# Patient Record
Sex: MALE | Race: WHITE | Employment: UNEMPLOYED
[De-identification: names, ages, dates, MRNs, and addresses within clinical notes are randomized per-mention and may not be internally consistent; named-entity substitution may affect disease eponyms.]

---

## 2017-01-10 RX ORDER — LOVASTATIN 20 MG/1
TABLET ORAL
Qty: 30 TABLET | Refills: 11 | Status: SHIPPED | OUTPATIENT
Start: 2017-01-10 | End: 2017-12-04 | Stop reason: SDUPTHER

## 2017-01-10 RX ORDER — FUROSEMIDE 20 MG/1
TABLET ORAL
Qty: 120 TABLET | Refills: 11 | Status: SHIPPED | OUTPATIENT
Start: 2017-01-10 | End: 2017-05-18 | Stop reason: SDUPTHER

## 2017-01-24 ENCOUNTER — OFFICE VISIT (OUTPATIENT)
Dept: FAMILY MEDICINE CLINIC | Facility: CLINIC | Age: 82
End: 2017-01-24

## 2017-01-24 VITALS
SYSTOLIC BLOOD PRESSURE: 126 MMHG | DIASTOLIC BLOOD PRESSURE: 80 MMHG | OXYGEN SATURATION: 97 % | HEART RATE: 53 BPM | BODY MASS INDEX: 29.6 KG/M2 | WEIGHT: 189 LBS

## 2017-01-24 DIAGNOSIS — I25.10 CORONARY ARTERY DISEASE INVOLVING NATIVE CORONARY ARTERY OF NATIVE HEART WITHOUT ANGINA PECTORIS: Chronic | ICD-10-CM

## 2017-01-24 DIAGNOSIS — J41.0 SIMPLE CHRONIC BRONCHITIS (HCC): Chronic | ICD-10-CM

## 2017-01-24 DIAGNOSIS — I38 VALVULAR HEART DISEASE: ICD-10-CM

## 2017-01-24 DIAGNOSIS — R06.02 SOB (SHORTNESS OF BREATH) ON EXERTION: Primary | ICD-10-CM

## 2017-01-24 PROCEDURE — 4040F PNEUMOC VAC/ADMIN/RCVD: CPT

## 2017-01-24 PROCEDURE — G8598 ASA/ANTIPLAT THER USED: HCPCS

## 2017-01-24 PROCEDURE — G8926 SPIRO NO PERF OR DOC: HCPCS

## 2017-01-24 PROCEDURE — G8427 DOCREV CUR MEDS BY ELIG CLIN: HCPCS

## 2017-01-24 PROCEDURE — 99214 OFFICE O/P EST MOD 30 MIN: CPT

## 2017-01-24 PROCEDURE — G8420 CALC BMI NORM PARAMETERS: HCPCS

## 2017-01-24 PROCEDURE — G8484 FLU IMMUNIZE NO ADMIN: HCPCS

## 2017-01-24 PROCEDURE — 3023F SPIROM DOC REV: CPT

## 2017-01-24 PROCEDURE — 1123F ACP DISCUSS/DSCN MKR DOCD: CPT

## 2017-01-24 PROCEDURE — 1036F TOBACCO NON-USER: CPT

## 2017-01-26 DIAGNOSIS — R06.02 SOB (SHORTNESS OF BREATH) ON EXERTION: ICD-10-CM

## 2017-01-27 ENCOUNTER — TELEPHONE (OUTPATIENT)
Dept: FAMILY MEDICINE CLINIC | Facility: CLINIC | Age: 82
End: 2017-01-27

## 2017-02-01 ENCOUNTER — OFFICE VISIT (OUTPATIENT)
Dept: FAMILY MEDICINE CLINIC | Facility: CLINIC | Age: 82
End: 2017-02-01

## 2017-02-01 VITALS
TEMPERATURE: 98.6 F | BODY MASS INDEX: 29.79 KG/M2 | SYSTOLIC BLOOD PRESSURE: 130 MMHG | HEART RATE: 53 BPM | OXYGEN SATURATION: 98 % | WEIGHT: 190.2 LBS | DIASTOLIC BLOOD PRESSURE: 80 MMHG

## 2017-02-01 DIAGNOSIS — J41.0 SIMPLE CHRONIC BRONCHITIS (HCC): Primary | ICD-10-CM

## 2017-02-01 PROCEDURE — 4040F PNEUMOC VAC/ADMIN/RCVD: CPT

## 2017-02-01 PROCEDURE — G8926 SPIRO NO PERF OR DOC: HCPCS

## 2017-02-01 PROCEDURE — G8420 CALC BMI NORM PARAMETERS: HCPCS

## 2017-02-01 PROCEDURE — G8598 ASA/ANTIPLAT THER USED: HCPCS

## 2017-02-01 PROCEDURE — 1036F TOBACCO NON-USER: CPT

## 2017-02-01 PROCEDURE — 99213 OFFICE O/P EST LOW 20 MIN: CPT

## 2017-02-01 PROCEDURE — G8484 FLU IMMUNIZE NO ADMIN: HCPCS

## 2017-02-01 PROCEDURE — 1123F ACP DISCUSS/DSCN MKR DOCD: CPT

## 2017-02-01 PROCEDURE — G8428 CUR MEDS NOT DOCUMENT: HCPCS

## 2017-02-01 PROCEDURE — 3023F SPIROM DOC REV: CPT

## 2017-02-01 ASSESSMENT — ENCOUNTER SYMPTOMS
CHEST TIGHTNESS: 0
COUGH: 1
STRIDOR: 0
WHEEZING: 0

## 2017-02-03 ASSESSMENT — ENCOUNTER SYMPTOMS
SHORTNESS OF BREATH: 1
BLURRED VISION: 0
STRIDOR: 0
SORE THROAT: 0
SPUTUM PRODUCTION: 0
WHEEZING: 0
ORTHOPNEA: 0
GASTROINTESTINAL NEGATIVE: 1
CHEST TIGHTNESS: 0
COUGH: 0

## 2017-02-08 ENCOUNTER — HOSPITAL ENCOUNTER (OUTPATIENT)
Age: 82
Discharge: HOME | End: 2017-02-08
Payer: MEDICARE

## 2017-02-08 LAB
INR BLD: 2.7
PROTHROMBIN TIME: 30.1 SEC (ref 9.7–12)

## 2017-02-08 PROCEDURE — 36415 COLL VENOUS BLD VENIPUNCTURE: CPT

## 2017-02-08 PROCEDURE — 85610 PROTHROMBIN TIME: CPT

## 2017-03-08 ENCOUNTER — HOSPITAL ENCOUNTER (OUTPATIENT)
Age: 82
Discharge: HOME | End: 2017-03-08
Payer: MEDICARE

## 2017-03-08 LAB
INR BLD: 3
PROTHROMBIN TIME: 33.4 SEC (ref 9.7–12)

## 2017-03-08 PROCEDURE — 36415 COLL VENOUS BLD VENIPUNCTURE: CPT

## 2017-03-08 PROCEDURE — 85610 PROTHROMBIN TIME: CPT

## 2017-03-15 ENCOUNTER — OFFICE VISIT (OUTPATIENT)
Dept: FAMILY MEDICINE CLINIC | Age: 82
End: 2017-03-15
Payer: MEDICARE

## 2017-03-15 VITALS
SYSTOLIC BLOOD PRESSURE: 128 MMHG | HEART RATE: 63 BPM | BODY MASS INDEX: 29.57 KG/M2 | DIASTOLIC BLOOD PRESSURE: 80 MMHG | OXYGEN SATURATION: 97 % | WEIGHT: 188.8 LBS

## 2017-03-15 DIAGNOSIS — J41.0 SIMPLE CHRONIC BRONCHITIS (HCC): Primary | ICD-10-CM

## 2017-03-15 PROCEDURE — G8420 CALC BMI NORM PARAMETERS: HCPCS

## 2017-03-15 PROCEDURE — 1123F ACP DISCUSS/DSCN MKR DOCD: CPT

## 2017-03-15 PROCEDURE — 3023F SPIROM DOC REV: CPT

## 2017-03-15 PROCEDURE — 1036F TOBACCO NON-USER: CPT

## 2017-03-15 PROCEDURE — G8598 ASA/ANTIPLAT THER USED: HCPCS

## 2017-03-15 PROCEDURE — G8427 DOCREV CUR MEDS BY ELIG CLIN: HCPCS

## 2017-03-15 PROCEDURE — 99213 OFFICE O/P EST LOW 20 MIN: CPT

## 2017-03-15 PROCEDURE — G8926 SPIRO NO PERF OR DOC: HCPCS

## 2017-03-15 PROCEDURE — G8484 FLU IMMUNIZE NO ADMIN: HCPCS

## 2017-03-15 PROCEDURE — 4040F PNEUMOC VAC/ADMIN/RCVD: CPT

## 2017-03-15 ASSESSMENT — ENCOUNTER SYMPTOMS
WHEEZING: 0
STRIDOR: 0
SHORTNESS OF BREATH: 1
COUGH: 1

## 2017-03-31 ENCOUNTER — TELEPHONE (OUTPATIENT)
Dept: FAMILY MEDICINE CLINIC | Age: 82
End: 2017-03-31

## 2017-04-11 ENCOUNTER — OFFICE VISIT (OUTPATIENT)
Dept: FAMILY MEDICINE CLINIC | Age: 82
End: 2017-04-11
Payer: MEDICARE

## 2017-04-11 VITALS
HEART RATE: 86 BPM | WEIGHT: 183.6 LBS | OXYGEN SATURATION: 98 % | BODY MASS INDEX: 28.76 KG/M2 | DIASTOLIC BLOOD PRESSURE: 80 MMHG | SYSTOLIC BLOOD PRESSURE: 122 MMHG | TEMPERATURE: 98 F

## 2017-04-11 DIAGNOSIS — J01.00 ACUTE NON-RECURRENT MAXILLARY SINUSITIS: ICD-10-CM

## 2017-04-11 DIAGNOSIS — J06.9 UPPER RESPIRATORY TRACT INFECTION, UNSPECIFIED TYPE: Primary | ICD-10-CM

## 2017-04-11 PROCEDURE — G8427 DOCREV CUR MEDS BY ELIG CLIN: HCPCS

## 2017-04-11 PROCEDURE — 1036F TOBACCO NON-USER: CPT

## 2017-04-11 PROCEDURE — G8420 CALC BMI NORM PARAMETERS: HCPCS

## 2017-04-11 PROCEDURE — 1123F ACP DISCUSS/DSCN MKR DOCD: CPT

## 2017-04-11 PROCEDURE — 4040F PNEUMOC VAC/ADMIN/RCVD: CPT

## 2017-04-11 PROCEDURE — 99213 OFFICE O/P EST LOW 20 MIN: CPT

## 2017-04-11 PROCEDURE — G8598 ASA/ANTIPLAT THER USED: HCPCS

## 2017-04-11 RX ORDER — LEVOFLOXACIN 500 MG/1
500 TABLET, FILM COATED ORAL DAILY
Qty: 10 TABLET | Refills: 0 | Status: SHIPPED | OUTPATIENT
Start: 2017-04-11 | End: 2017-04-21

## 2017-04-11 ASSESSMENT — ENCOUNTER SYMPTOMS
COUGH: 1
SORE THROAT: 0
SINUS PAIN: 1
NAUSEA: 0
SWOLLEN GLANDS: 0
RHINORRHEA: 1
ABDOMINAL PAIN: 0

## 2017-04-12 ENCOUNTER — HOSPITAL ENCOUNTER (OUTPATIENT)
Age: 82
Discharge: HOME | End: 2017-04-12
Payer: MEDICARE

## 2017-04-12 LAB
INR BLD: 2.3
PROTHROMBIN TIME: 25.6 SEC (ref 9.7–12)

## 2017-04-12 PROCEDURE — 36415 COLL VENOUS BLD VENIPUNCTURE: CPT

## 2017-04-12 PROCEDURE — 85610 PROTHROMBIN TIME: CPT

## 2017-04-17 ENCOUNTER — HOSPITAL ENCOUNTER (OUTPATIENT)
Age: 82
Discharge: HOME | End: 2017-04-17
Payer: MEDICARE

## 2017-04-17 LAB
INR BLD: 2.8
PROTHROMBIN TIME: 32.1 SEC (ref 9.7–12)

## 2017-04-17 PROCEDURE — 85610 PROTHROMBIN TIME: CPT

## 2017-04-17 PROCEDURE — 36415 COLL VENOUS BLD VENIPUNCTURE: CPT

## 2017-05-16 ENCOUNTER — HOSPITAL ENCOUNTER (OUTPATIENT)
Age: 82
Discharge: HOME | End: 2017-05-16
Payer: MEDICARE

## 2017-05-16 LAB
INR BLD: 2.5
PROTHROMBIN TIME: 27.8 SEC (ref 9.7–12)

## 2017-05-16 PROCEDURE — 36415 COLL VENOUS BLD VENIPUNCTURE: CPT

## 2017-05-16 PROCEDURE — 85610 PROTHROMBIN TIME: CPT

## 2017-05-18 ENCOUNTER — OFFICE VISIT (OUTPATIENT)
Dept: FAMILY MEDICINE CLINIC | Age: 82
End: 2017-05-18
Payer: MEDICARE

## 2017-05-18 VITALS
BODY MASS INDEX: 28.5 KG/M2 | WEIGHT: 181.6 LBS | OXYGEN SATURATION: 97 % | HEART RATE: 70 BPM | DIASTOLIC BLOOD PRESSURE: 66 MMHG | HEIGHT: 67 IN | SYSTOLIC BLOOD PRESSURE: 118 MMHG

## 2017-05-18 DIAGNOSIS — I38 VALVULAR HEART DISEASE: ICD-10-CM

## 2017-05-18 DIAGNOSIS — J41.0 SIMPLE CHRONIC BRONCHITIS (HCC): Primary | ICD-10-CM

## 2017-05-18 PROCEDURE — 1036F TOBACCO NON-USER: CPT

## 2017-05-18 PROCEDURE — G8598 ASA/ANTIPLAT THER USED: HCPCS

## 2017-05-18 PROCEDURE — G8427 DOCREV CUR MEDS BY ELIG CLIN: HCPCS

## 2017-05-18 PROCEDURE — G8926 SPIRO NO PERF OR DOC: HCPCS

## 2017-05-18 PROCEDURE — 3023F SPIROM DOC REV: CPT

## 2017-05-18 PROCEDURE — 99213 OFFICE O/P EST LOW 20 MIN: CPT

## 2017-05-18 PROCEDURE — G8420 CALC BMI NORM PARAMETERS: HCPCS

## 2017-05-18 PROCEDURE — 4040F PNEUMOC VAC/ADMIN/RCVD: CPT

## 2017-05-18 PROCEDURE — 1123F ACP DISCUSS/DSCN MKR DOCD: CPT

## 2017-05-18 RX ORDER — FUROSEMIDE 20 MG/1
TABLET ORAL
Qty: 120 TABLET | Refills: 11
Start: 2017-05-18 | End: 2017-12-04 | Stop reason: SDUPTHER

## 2017-05-18 ASSESSMENT — PATIENT HEALTH QUESTIONNAIRE - PHQ9
SUM OF ALL RESPONSES TO PHQ QUESTIONS 1-9: 0
1. LITTLE INTEREST OR PLEASURE IN DOING THINGS: 0
2. FEELING DOWN, DEPRESSED OR HOPELESS: 0
SUM OF ALL RESPONSES TO PHQ9 QUESTIONS 1 & 2: 0

## 2017-05-18 ASSESSMENT — ENCOUNTER SYMPTOMS
SHORTNESS OF BREATH: 1
COUGH: 1

## 2017-06-21 ENCOUNTER — HOSPITAL ENCOUNTER (OUTPATIENT)
Age: 82
Discharge: HOME | End: 2017-06-21
Payer: MEDICARE

## 2017-06-21 LAB
INR BLD: 2.7
PROTHROMBIN TIME: 30.2 SEC (ref 9.7–12)

## 2017-06-21 PROCEDURE — 85610 PROTHROMBIN TIME: CPT

## 2017-06-21 PROCEDURE — 36415 COLL VENOUS BLD VENIPUNCTURE: CPT

## 2017-07-18 ENCOUNTER — OFFICE VISIT (OUTPATIENT)
Dept: FAMILY MEDICINE CLINIC | Age: 82
End: 2017-07-18
Payer: MEDICARE

## 2017-07-18 VITALS
BODY MASS INDEX: 28.19 KG/M2 | HEART RATE: 72 BPM | SYSTOLIC BLOOD PRESSURE: 116 MMHG | WEIGHT: 180 LBS | DIASTOLIC BLOOD PRESSURE: 82 MMHG

## 2017-07-18 DIAGNOSIS — F03.91 DEMENTIA WITH BEHAVIORAL DISTURBANCE, UNSPECIFIED DEMENTIA TYPE: Primary | ICD-10-CM

## 2017-07-18 DIAGNOSIS — I48.20 CHRONIC ATRIAL FIBRILLATION (HCC): ICD-10-CM

## 2017-07-18 DIAGNOSIS — I71.20 THORACIC AORTIC ANEURYSM WITHOUT RUPTURE: ICD-10-CM

## 2017-07-18 PROCEDURE — 1123F ACP DISCUSS/DSCN MKR DOCD: CPT

## 2017-07-18 PROCEDURE — G8419 CALC BMI OUT NRM PARAM NOF/U: HCPCS

## 2017-07-18 PROCEDURE — 99213 OFFICE O/P EST LOW 20 MIN: CPT

## 2017-07-18 PROCEDURE — G8598 ASA/ANTIPLAT THER USED: HCPCS

## 2017-07-18 PROCEDURE — 1036F TOBACCO NON-USER: CPT

## 2017-07-18 PROCEDURE — 4040F PNEUMOC VAC/ADMIN/RCVD: CPT

## 2017-07-18 PROCEDURE — G8427 DOCREV CUR MEDS BY ELIG CLIN: HCPCS

## 2017-07-18 ASSESSMENT — ENCOUNTER SYMPTOMS
COUGH: 1
SHORTNESS OF BREATH: 1

## 2017-07-26 ENCOUNTER — HOSPITAL ENCOUNTER (OUTPATIENT)
Age: 82
Discharge: HOME | End: 2017-07-26
Payer: MEDICARE

## 2017-07-26 LAB
INR BLD: 1.8
PROTHROMBIN TIME: 19.8 SEC (ref 9.7–12)

## 2017-07-26 PROCEDURE — 36415 COLL VENOUS BLD VENIPUNCTURE: CPT

## 2017-07-26 PROCEDURE — 85610 PROTHROMBIN TIME: CPT

## 2017-08-23 ENCOUNTER — HOSPITAL ENCOUNTER (OUTPATIENT)
Age: 82
Discharge: HOME | End: 2017-08-23
Payer: MEDICARE

## 2017-08-23 LAB
INR BLD: 2.4
PROTHROMBIN TIME: 27.2 SEC (ref 9.7–12)

## 2017-08-23 PROCEDURE — 36415 COLL VENOUS BLD VENIPUNCTURE: CPT

## 2017-08-23 PROCEDURE — 85610 PROTHROMBIN TIME: CPT

## 2017-09-20 ENCOUNTER — HOSPITAL ENCOUNTER (OUTPATIENT)
Age: 82
Discharge: HOME | End: 2017-09-20
Payer: MEDICARE

## 2017-09-20 LAB
INR BLD: 2.8
PROTHROMBIN TIME: 31.8 SEC (ref 9.7–12)

## 2017-09-20 PROCEDURE — 85610 PROTHROMBIN TIME: CPT

## 2017-09-20 PROCEDURE — 36415 COLL VENOUS BLD VENIPUNCTURE: CPT

## 2017-10-19 ENCOUNTER — OFFICE VISIT (OUTPATIENT)
Dept: FAMILY MEDICINE CLINIC | Age: 82
End: 2017-10-19
Payer: MEDICARE

## 2017-10-19 VITALS
WEIGHT: 179 LBS | BODY MASS INDEX: 28.04 KG/M2 | HEART RATE: 69 BPM | SYSTOLIC BLOOD PRESSURE: 130 MMHG | DIASTOLIC BLOOD PRESSURE: 70 MMHG

## 2017-10-19 DIAGNOSIS — Z23 IMMUNIZATION DUE: ICD-10-CM

## 2017-10-19 DIAGNOSIS — I35.1 NONRHEUMATIC AORTIC VALVE INSUFFICIENCY: ICD-10-CM

## 2017-10-19 DIAGNOSIS — I71.21 ASCENDING AORTIC ANEURYSM: Chronic | ICD-10-CM

## 2017-10-19 DIAGNOSIS — J41.0 SIMPLE CHRONIC BRONCHITIS (HCC): Primary | Chronic | ICD-10-CM

## 2017-10-19 DIAGNOSIS — I48.20 CHRONIC ATRIAL FIBRILLATION (HCC): Chronic | ICD-10-CM

## 2017-10-19 PROCEDURE — 4040F PNEUMOC VAC/ADMIN/RCVD: CPT | Performed by: FAMILY MEDICINE

## 2017-10-19 PROCEDURE — G8484 FLU IMMUNIZE NO ADMIN: HCPCS | Performed by: FAMILY MEDICINE

## 2017-10-19 PROCEDURE — 1123F ACP DISCUSS/DSCN MKR DOCD: CPT | Performed by: FAMILY MEDICINE

## 2017-10-19 PROCEDURE — G8427 DOCREV CUR MEDS BY ELIG CLIN: HCPCS | Performed by: FAMILY MEDICINE

## 2017-10-19 PROCEDURE — G8417 CALC BMI ABV UP PARAM F/U: HCPCS | Performed by: FAMILY MEDICINE

## 2017-10-19 PROCEDURE — 3023F SPIROM DOC REV: CPT | Performed by: FAMILY MEDICINE

## 2017-10-19 PROCEDURE — G8926 SPIRO NO PERF OR DOC: HCPCS | Performed by: FAMILY MEDICINE

## 2017-10-19 PROCEDURE — 1036F TOBACCO NON-USER: CPT | Performed by: FAMILY MEDICINE

## 2017-10-19 PROCEDURE — 99213 OFFICE O/P EST LOW 20 MIN: CPT | Performed by: FAMILY MEDICINE

## 2017-10-19 PROCEDURE — G8598 ASA/ANTIPLAT THER USED: HCPCS | Performed by: FAMILY MEDICINE

## 2017-10-19 PROCEDURE — 90688 IIV4 VACCINE SPLT 0.5 ML IM: CPT | Performed by: FAMILY MEDICINE

## 2017-10-19 PROCEDURE — G0008 ADMIN INFLUENZA VIRUS VAC: HCPCS | Performed by: FAMILY MEDICINE

## 2017-10-19 ASSESSMENT — ENCOUNTER SYMPTOMS
BACK PAIN: 0
BLOOD IN STOOL: 0
SHORTNESS OF BREATH: 1
WHEEZING: 0
CONSTIPATION: 0
ABDOMINAL PAIN: 0
COUGH: 0
DIARRHEA: 0

## 2017-10-19 NOTE — PROGRESS NOTES
09/01/2017    Pneumococcal low/med risk  Completed         Past Medical History:   Diagnosis Date    Aortic regurgitation     severe    Ascending aortic aneurysm (HCC)     Atrial fibrillation (HCC)     CAD (coronary artery disease)     Congestive heart failure (HCC)     COPD (chronic obstructive pulmonary disease) ([facility] [address] 2/9/2015    COPD with acute exacerbation (HCC) 2/9/2015    Hyperlipemia     Hypertension     Mitral regurgitation     severe    Mitral valve prolapse     Tricuspid regurgitation     moderate      Past Surgical History:   Procedure Laterality Date    AORTIC VALVE REPLACEMENT      CARDIAC CATHETERIZATION  9/6/11    Dr. Clement Lunsford  9/8/11    Dr. Rusty Floyd       Family History   Problem Relation Age of Onset    Heart Attack Mother     Heart Attack Sister     Hypertension Sister      Social History   Substance Use Topics    Smoking status: Never Smoker    Smokeless tobacco: Not on file    Alcohol use No      Current Outpatient Prescriptions   Medication Sig Dispense Refill    furosemide (LASIX) 20 MG tablet TAKE 2 TABLETS BY MOUTH TWICE DAILY 120 tablet 11    lovastatin (MEVACOR) 20 MG tablet TAKE 1 TABLET BY MOUTH AT BEDTIME 30 tablet 11    Cholecalciferol (VITAMIN D3) 2000 UNITS CAPS Take 1 capsule by mouth daily      latanoprost (XALATAN) 0.005 % ophthalmic solution Place 1 drop into both eyes nightly 1 Bottle 4    metoprolol (LOPRESSOR) 25 MG tablet Take 12.5 mg by mouth 2 times daily      Tetrahydroz-Dextran-PEG-Povid (EYE DROPS ADVANCED RELIEF OP) Apply 1 drop to eye every morning.  Fish Oil-Cholecalciferol (FISH OIL + D3) 7294-8466 MG-UNIT CAPS Take 1 capsule by mouth 2 times daily.  warfarin (COUMADIN) 5 MG tablet Po qd 30 tablet 3    diltiazem (CARDIZEM CD) 120 MG ER capsule Take 1 capsule by mouth daily. 30 capsule 3    digoxin (LANOXIN) 125 MCG tablet Take 1 tablet by mouth daily.  30 tablet 3

## 2017-10-26 ENCOUNTER — HOSPITAL ENCOUNTER (OUTPATIENT)
Age: 82
Discharge: HOME | End: 2017-10-26
Payer: MEDICARE

## 2017-10-26 LAB
INR BLD: 2.5
PROTHROMBIN TIME: 28.3 SEC (ref 9.7–12)

## 2017-10-26 PROCEDURE — 36415 COLL VENOUS BLD VENIPUNCTURE: CPT

## 2017-10-26 PROCEDURE — 85610 PROTHROMBIN TIME: CPT

## 2017-11-02 ENCOUNTER — OFFICE VISIT (OUTPATIENT)
Dept: FAMILY MEDICINE CLINIC | Age: 82
End: 2017-11-02
Payer: MEDICARE

## 2017-11-02 ENCOUNTER — HOSPITAL ENCOUNTER (OUTPATIENT)
Age: 82
Setting detail: SPECIMEN
Discharge: HOME | End: 2017-11-02
Payer: MEDICARE

## 2017-11-02 VITALS — DIASTOLIC BLOOD PRESSURE: 78 MMHG | HEART RATE: 65 BPM | SYSTOLIC BLOOD PRESSURE: 138 MMHG

## 2017-11-02 DIAGNOSIS — F03.90 DEMENTIA WITHOUT BEHAVIORAL DISTURBANCE, UNSPECIFIED DEMENTIA TYPE: Chronic | ICD-10-CM

## 2017-11-02 DIAGNOSIS — R41.3 MEMORY LOSS: ICD-10-CM

## 2017-11-02 DIAGNOSIS — R41.3 MEMORY LOSS: Primary | ICD-10-CM

## 2017-11-02 LAB
ABSOLUTE EOS #: 0.41 K/UL (ref 0–0.44)
ABSOLUTE IMMATURE GRANULOCYTE: <0.03 K/UL (ref 0–0.3)
ABSOLUTE LYMPH #: 2.33 K/UL (ref 1.1–3.7)
ABSOLUTE MONO #: 1.03 K/UL (ref 0.1–1.2)
ALBUMIN SERPL-MCNC: 4.2 G/DL (ref 3.5–5.2)
ALBUMIN/GLOBULIN RATIO: 1.4 (ref 1–2.5)
ALP BLD-CCNC: 84 U/L (ref 40–129)
ALT SERPL-CCNC: 15 U/L (ref 5–41)
ANION GAP SERPL CALCULATED.3IONS-SCNC: 15 MMOL/L (ref 9–17)
AST SERPL-CCNC: 22 U/L
BASOPHILS # BLD: 1 % (ref 0–2)
BASOPHILS ABSOLUTE: 0.08 K/UL (ref 0–0.2)
BILIRUB SERPL-MCNC: 0.59 MG/DL (ref 0.3–1.2)
BUN BLDV-MCNC: 24 MG/DL (ref 8–23)
BUN/CREAT BLD: ABNORMAL (ref 9–20)
CALCIUM SERPL-MCNC: 9.3 MG/DL (ref 8.6–10.4)
CHLORIDE BLD-SCNC: 100 MMOL/L (ref 98–107)
CO2: 25 MMOL/L (ref 20–31)
CREAT SERPL-MCNC: 0.9 MG/DL (ref 0.7–1.2)
DIFFERENTIAL TYPE: ABNORMAL
EOSINOPHILS RELATIVE PERCENT: 4 % (ref 1–4)
FOLATE: >20 NG/ML
GFR AFRICAN AMERICAN: >60 ML/MIN
GFR NON-AFRICAN AMERICAN: >60 ML/MIN
GFR SERPL CREATININE-BSD FRML MDRD: ABNORMAL ML/MIN/{1.73_M2}
GFR SERPL CREATININE-BSD FRML MDRD: ABNORMAL ML/MIN/{1.73_M2}
GLUCOSE BLD-MCNC: 87 MG/DL (ref 70–99)
HCT VFR BLD CALC: 40.8 % (ref 40.7–50.3)
HEMOGLOBIN: 12.5 G/DL (ref 13–17)
IMMATURE GRANULOCYTES: 0 %
LYMPHOCYTES # BLD: 25 % (ref 24–43)
MCH RBC QN AUTO: 29.1 PG (ref 25.2–33.5)
MCHC RBC AUTO-ENTMCNC: 30.6 G/DL (ref 29.9–34.7)
MCV RBC AUTO: 95.1 FL (ref 82.6–102.9)
MONOCYTES # BLD: 11 % (ref 3–12)
PDW BLD-RTO: 15.3 % (ref 11.8–14.4)
PLATELET # BLD: 197 K/UL (ref 138–453)
PLATELET ESTIMATE: ABNORMAL
PMV BLD AUTO: 11.5 FL (ref 8.1–13.5)
POTASSIUM SERPL-SCNC: 4.9 MMOL/L (ref 3.7–5.3)
RBC # BLD: 4.29 M/UL (ref 4.21–5.77)
RBC # BLD: ABNORMAL 10*6/UL
SEG NEUTROPHILS: 59 % (ref 36–65)
SEGMENTED NEUTROPHILS ABSOLUTE COUNT: 5.58 K/UL (ref 1.5–8.1)
SODIUM BLD-SCNC: 140 MMOL/L (ref 135–144)
T4 TOTAL: 7.6 UG/DL (ref 4.5–12)
TOTAL PROTEIN: 7.2 G/DL (ref 6.4–8.3)
TSH SERPL DL<=0.05 MIU/L-ACNC: 6.71 MIU/L (ref 0.3–5)
VITAMIN B-12: 646 PG/ML (ref 211–946)
WBC # BLD: 9.5 K/UL (ref 3.5–11.3)
WBC # BLD: ABNORMAL 10*3/UL

## 2017-11-02 PROCEDURE — 1123F ACP DISCUSS/DSCN MKR DOCD: CPT | Performed by: FAMILY MEDICINE

## 2017-11-02 PROCEDURE — 4040F PNEUMOC VAC/ADMIN/RCVD: CPT | Performed by: FAMILY MEDICINE

## 2017-11-02 PROCEDURE — G8427 DOCREV CUR MEDS BY ELIG CLIN: HCPCS | Performed by: FAMILY MEDICINE

## 2017-11-02 PROCEDURE — 99213 OFFICE O/P EST LOW 20 MIN: CPT | Performed by: FAMILY MEDICINE

## 2017-11-02 PROCEDURE — G8417 CALC BMI ABV UP PARAM F/U: HCPCS | Performed by: FAMILY MEDICINE

## 2017-11-02 PROCEDURE — G8598 ASA/ANTIPLAT THER USED: HCPCS | Performed by: FAMILY MEDICINE

## 2017-11-02 PROCEDURE — 1036F TOBACCO NON-USER: CPT | Performed by: FAMILY MEDICINE

## 2017-11-02 PROCEDURE — G8484 FLU IMMUNIZE NO ADMIN: HCPCS | Performed by: FAMILY MEDICINE

## 2017-11-02 ASSESSMENT — ENCOUNTER SYMPTOMS
DIARRHEA: 0
COUGH: 0
BACK PAIN: 0
ABDOMINAL PAIN: 0
CONSTIPATION: 0
WHEEZING: 0
SHORTNESS OF BREATH: 0
BLOOD IN STOOL: 0

## 2017-11-02 NOTE — PROGRESS NOTES
Ag Berkowitz is a 80 y.o. male who presents today for his medical conditions/complaints as noted below. Ag Berkowitz is c/o of Memory Loss  80year old male with family concerns over memory loss. eg a picture of his wife who  15 years ago he forgot who she was. He lives alone but has daily input by his large group of kids and family. HPI:     Visit Information    Have you changed or started any medications since your last visit including any over-the-counter medicines, vitamins, or herbal medicines? no   Are you having any side effects from any of your medications? -  no  Have you stopped taking any of your medications? Is so, why? -  no    Have you seen any other physician or provider since your last visit? No  Have you had any other diagnostic tests since your last visit? No  Have you been seen in the emergency room and/or had an admission to a hospital since we last saw you? No  Have you had your routine dental cleaning in the past 6 months? yes -     Have you activated your [employer] account? If not, what are your barriers?  No:      Patient Care Team:  Oziel Fuentes MD as PCP - General (Family Medicine)  Eneida Colon MD as PCP - S Attributed Provider  Elier Dobson MD (Cardiology)  Abdiaziz Stephen MD as Referring Physician (Cardiology)    Medical History Review  Past Medical, Family, and Social History reviewed and  contribute to the patient presenting condition    Health Maintenance   Topic Date Due    DTaP/Tdap/Td vaccine (1 - Tdap) 10[birth year]10/1943    Zostavax vaccine  10/10/1984    Flu vaccine  Completed    Pneumococcal low/med risk  Completed       Past Medical History:   Diagnosis Date    Aortic regurgitation     severe    Ascending aortic aneurysm ([address]     Atrial fibrillation ([address]     CAD (coronary artery disease)     Congestive heart failure ([address]     COPD (chronic obstructive pulmonary disease) ([address] 2015    COPD with acute exacerbation ([address] 2015   

## 2017-11-14 ENCOUNTER — HOSPITAL ENCOUNTER (OUTPATIENT)
Dept: MRI IMAGING | Age: 82
Discharge: HOME | End: 2017-11-14
Payer: MEDICARE

## 2017-11-14 DIAGNOSIS — F03.90 DEMENTIA WITHOUT BEHAVIORAL DISTURBANCE, UNSPECIFIED DEMENTIA TYPE: Chronic | ICD-10-CM

## 2017-11-14 DIAGNOSIS — R41.3 MEMORY LOSS: ICD-10-CM

## 2017-11-14 PROCEDURE — 70551 MRI BRAIN STEM W/O DYE: CPT

## 2017-11-15 RX ORDER — DONEPEZIL HYDROCHLORIDE 5 MG/1
5 TABLET, FILM COATED ORAL NIGHTLY
Qty: 30 TABLET | Refills: 3 | Status: SHIPPED | OUTPATIENT
Start: 2017-11-15 | End: 2017-11-15 | Stop reason: SDUPTHER

## 2017-11-15 RX ORDER — DONEPEZIL HYDROCHLORIDE 5 MG/1
TABLET, FILM COATED ORAL
Qty: 90 TABLET | Refills: 3 | Status: SHIPPED | OUTPATIENT
Start: 2017-11-15 | End: 2018-03-09 | Stop reason: DRUGHIGH

## 2017-11-30 ENCOUNTER — HOSPITAL ENCOUNTER (OUTPATIENT)
Age: 82
Discharge: HOME | End: 2017-11-30
Payer: MEDICARE

## 2017-11-30 LAB
INR BLD: 2.5
PROTHROMBIN TIME: 28.8 SEC (ref 9.7–12)

## 2017-11-30 PROCEDURE — 36415 COLL VENOUS BLD VENIPUNCTURE: CPT

## 2017-11-30 PROCEDURE — 85610 PROTHROMBIN TIME: CPT

## 2017-12-04 RX ORDER — FUROSEMIDE 20 MG/1
TABLET ORAL
Qty: 120 TABLET | Refills: 0 | Status: SHIPPED | OUTPATIENT
Start: 2017-12-04 | End: 2018-01-02 | Stop reason: SDUPTHER

## 2017-12-04 RX ORDER — LOVASTATIN 20 MG/1
TABLET ORAL
Qty: 30 TABLET | Refills: 0 | Status: SHIPPED | OUTPATIENT
Start: 2017-12-04 | End: 2018-01-02 | Stop reason: SDUPTHER

## 2017-12-13 ENCOUNTER — OFFICE VISIT (OUTPATIENT)
Dept: FAMILY MEDICINE CLINIC | Age: 82
End: 2017-12-13
Payer: MEDICARE

## 2017-12-13 VITALS
HEART RATE: 62 BPM | BODY MASS INDEX: 27.25 KG/M2 | WEIGHT: 184 LBS | DIASTOLIC BLOOD PRESSURE: 72 MMHG | HEIGHT: 69 IN | SYSTOLIC BLOOD PRESSURE: 140 MMHG

## 2017-12-13 DIAGNOSIS — F03.90 DEMENTIA WITHOUT BEHAVIORAL DISTURBANCE, UNSPECIFIED DEMENTIA TYPE: Primary | Chronic | ICD-10-CM

## 2017-12-13 DIAGNOSIS — M19.90 OSTEOARTHRITIS, UNSPECIFIED OSTEOARTHRITIS TYPE, UNSPECIFIED SITE: ICD-10-CM

## 2017-12-13 PROCEDURE — 1123F ACP DISCUSS/DSCN MKR DOCD: CPT | Performed by: FAMILY MEDICINE

## 2017-12-13 PROCEDURE — 1036F TOBACCO NON-USER: CPT | Performed by: FAMILY MEDICINE

## 2017-12-13 PROCEDURE — G8484 FLU IMMUNIZE NO ADMIN: HCPCS | Performed by: FAMILY MEDICINE

## 2017-12-13 PROCEDURE — 99213 OFFICE O/P EST LOW 20 MIN: CPT | Performed by: FAMILY MEDICINE

## 2017-12-13 PROCEDURE — G8427 DOCREV CUR MEDS BY ELIG CLIN: HCPCS | Performed by: FAMILY MEDICINE

## 2017-12-13 PROCEDURE — 4040F PNEUMOC VAC/ADMIN/RCVD: CPT | Performed by: FAMILY MEDICINE

## 2017-12-13 PROCEDURE — G8417 CALC BMI ABV UP PARAM F/U: HCPCS | Performed by: FAMILY MEDICINE

## 2017-12-13 PROCEDURE — G8598 ASA/ANTIPLAT THER USED: HCPCS | Performed by: FAMILY MEDICINE

## 2017-12-13 RX ORDER — DONEPEZIL HYDROCHLORIDE 10 MG/1
10 TABLET, FILM COATED ORAL NIGHTLY
Qty: 30 TABLET | Refills: 3 | Status: SHIPPED | OUTPATIENT
Start: 2017-12-13 | End: 2017-12-31 | Stop reason: SDUPTHER

## 2017-12-13 ASSESSMENT — ENCOUNTER SYMPTOMS
CONSTIPATION: 0
ABDOMINAL PAIN: 0
BLOOD IN STOOL: 0
WHEEZING: 0
BACK PAIN: 0
COUGH: 0
SHORTNESS OF BREATH: 0
DIARRHEA: 0

## 2017-12-13 NOTE — PROGRESS NOTES
 Hypertension     Mitral regurgitation     severe    Mitral valve prolapse     Tricuspid regurgitation     moderate      Past Surgical History:   Procedure Laterality Date    AORTIC VALVE REPLACEMENT      CARDIAC CATHETERIZATION  9/6/11    Dr. Mary Alice Lord  9/8/11    Dr. Adrienne Comer       Family History   Problem Relation Age of Onset    Heart Attack Mother     Heart Attack Sister     Hypertension Sister      Social History   Substance Use Topics    Smoking status: Never Smoker    Smokeless tobacco: Never Used    Alcohol use No      Current Outpatient Prescriptions   Medication Sig Dispense Refill    donepezil (ARICEPT) 10 MG tablet Take 1 tablet by mouth nightly 30 tablet 3    furosemide (LASIX) 20 MG tablet TAKE 2 TABLETS BY MOUTH TWICE DAILY 120 tablet 0    lovastatin (MEVACOR) 20 MG tablet TAKE 1 TABLET BY MOUTH AT BEDTIME 30 tablet 0    donepezil (ARICEPT) 5 MG tablet TAKE 1 TABLET BY MOUTH EVERY NIGHT 90 tablet 3    Cholecalciferol (VITAMIN D3) 2000 UNITS CAPS Take 1 capsule by mouth daily      latanoprost (XALATAN) 0.005 % ophthalmic solution Place 1 drop into both eyes nightly 1 Bottle 4    metoprolol (LOPRESSOR) 25 MG tablet Take 12.5 mg by mouth 2 times daily      Tetrahydroz-Dextran-PEG-Povid (EYE DROPS ADVANCED RELIEF OP) Apply 1 drop to eye every morning.  Fish Oil-Cholecalciferol (FISH OIL + D3) 7517-2470 MG-UNIT CAPS Take 1 capsule by mouth 2 times daily.  warfarin (COUMADIN) 5 MG tablet Po qd 30 tablet 3    diltiazem (CARDIZEM CD) 120 MG ER capsule Take 1 capsule by mouth daily. 30 capsule 3    digoxin (LANOXIN) 125 MCG tablet Take 1 tablet by mouth daily. 30 tablet 3    midodrine (PROAMATINE) 5 MG tablet Take 1 tablet by mouth 3 times daily (with meals). 90 tablet 3    aspirin 81 MG tablet Take 81 mg by mouth daily. No current facility-administered medications for this visit.       Allergies

## 2018-01-02 RX ORDER — DONEPEZIL HYDROCHLORIDE 10 MG/1
TABLET, FILM COATED ORAL
Qty: 90 TABLET | Refills: 3 | Status: SHIPPED | OUTPATIENT
Start: 2018-01-02 | End: 2019-01-24 | Stop reason: SDUPTHER

## 2018-01-02 RX ORDER — LOVASTATIN 20 MG/1
TABLET ORAL
Qty: 28 TABLET | Refills: 0 | Status: SHIPPED | OUTPATIENT
Start: 2018-01-02 | End: 2018-02-01 | Stop reason: SDUPTHER

## 2018-01-02 RX ORDER — FUROSEMIDE 20 MG/1
TABLET ORAL
Qty: 112 TABLET | Refills: 0 | Status: SHIPPED | OUTPATIENT
Start: 2018-01-02 | End: 2018-02-01 | Stop reason: SDUPTHER

## 2018-01-08 NOTE — TELEPHONE ENCOUNTER
Health Maintenance   Topic Date Due    DTaP/Tdap/Td vaccine (1 - Tdap) 10[birth year]10/1943    Zostavax vaccine  10/10/1984    Flu vaccine  Completed    Pneumococcal low/med risk  Completed       Hemoglobin A1C (%)   Date Value   09/07/2011 5.5   09/02/2011 5.4             ( goal A1C is < 7)   No results found for: LABMICR  LDL Cholesterol (mg/dL)   Date Value   09/20/2016 126       (goal LDL is <100)   AST (U/L)   Date Value   11/02/2017 22     ALT (U/L)   Date Value   11/02/2017 15     BUN (mg/dL)   Date Value   11/02/2017 24 (H)     BP Readings from Last 3 Encounters:   11/02/17 138/78   10/19/17 130/70   07/18/17 116/82          (goal 120/80)    All Future Testing planned in [facility]  Lab Frequency Next Occurrence       Next Visit Date:  Future Appointments  Date Time Provider Ashleigh Hines   4/24/2018 1:00 PM Fariha Herbert MD [facility]            Patient Active Problem List:     Hypertension     CAD (coronary artery disease)     Aortic regurgitation     Mitral regurgitation     Atrial fibrillation     Ascending aortic aneurysm     Valvular heart disease     Hyperlipidemia     COPD (chronic obstructive pulmonary disease) (HCC)     Diastolic dysfunction     Anemia     Fatigue     Dementia     Osteoarthritis Injury of head, initial encounter

## 2018-01-11 ENCOUNTER — HOSPITAL ENCOUNTER (OUTPATIENT)
Age: 83
Discharge: HOME | End: 2018-01-11
Payer: MEDICARE

## 2018-01-11 LAB
INR BLD: 2.5
PROTHROMBIN TIME: 28 SEC (ref 9.7–12)

## 2018-01-11 PROCEDURE — 36415 COLL VENOUS BLD VENIPUNCTURE: CPT

## 2018-01-11 PROCEDURE — 85610 PROTHROMBIN TIME: CPT

## 2018-02-01 RX ORDER — LOVASTATIN 20 MG/1
TABLET ORAL
Qty: 28 TABLET | Refills: 11 | Status: SHIPPED | OUTPATIENT
Start: 2018-02-01 | End: 2019-01-03 | Stop reason: SDUPTHER

## 2018-02-01 RX ORDER — FUROSEMIDE 20 MG/1
TABLET ORAL
Qty: 112 TABLET | Refills: 0 | Status: SHIPPED | OUTPATIENT
Start: 2018-02-01 | End: 2018-03-02 | Stop reason: SDUPTHER

## 2018-02-22 ENCOUNTER — HOSPITAL ENCOUNTER (OUTPATIENT)
Age: 83
Discharge: HOME | End: 2018-02-22
Payer: MEDICARE

## 2018-02-22 LAB
INR BLD: 2.2
PROTHROMBIN TIME: 22.5 SEC (ref 9.7–12)

## 2018-02-22 PROCEDURE — 36415 COLL VENOUS BLD VENIPUNCTURE: CPT

## 2018-02-22 PROCEDURE — 85610 PROTHROMBIN TIME: CPT

## 2018-03-02 RX ORDER — FUROSEMIDE 20 MG/1
TABLET ORAL
Qty: 112 TABLET | Refills: 3 | Status: SHIPPED | OUTPATIENT
Start: 2018-03-02 | End: 2018-06-25 | Stop reason: SDUPTHER

## 2018-03-02 NOTE — TELEPHONE ENCOUNTER
Health Maintenance   Topic Date Due    DTaP/Tdap/Td vaccine (1 - Tdap) 10/10/1943    Shingles Vaccine (1 of 2 - 2 Dose Series) 10/10/1974    Potassium monitoring  11/02/2018    Creatinine monitoring  11/02/2018    Flu vaccine  Completed    Pneumococcal low/med risk  Completed       Hemoglobin A1C (%)   Date Value   09/07/2011 5.5   09/02/2011 5.4             ( goal A1C is < 7)   No results found for: LABMICR  LDL Cholesterol (mg/dL)   Date Value   09/20/2016 126       (goal LDL is <100)   AST (U/L)   Date Value   11/02/2017 22     ALT (U/L)   Date Value   11/02/2017 15     BUN (mg/dL)   Date Value   11/02/2017 24 (H)     BP Readings from Last 3 Encounters:   12/13/17 (!) 140/72   11/02/17 138/78   10/19/17 130/70          (goal 120/80)    All Future Testing planned in [facility]  Lab Frequency Next Occurrence       Next Visit Date:  Future Appointments  Date Time Provider Ashleigh Hines   3/15/2018 11:00 AM MD NATASHA Goddard P[facility]   4/24/2018 1:00 PM MD NATASHA Goddard            Patient Active Problem List:     Hypertension     CAD (coronary artery disease)     Aortic regurgitation     Mitral regurgitation     Atrial fibrillation     Ascending aortic aneurysm     Valvular heart disease     Hyperlipidemia     COPD (chronic obstructive pulmonary disease) (HCC)     Diastolic dysfunction     Anemia     Fatigue     Dementia     Osteoarthritis

## 2018-03-09 ENCOUNTER — OFFICE VISIT (OUTPATIENT)
Dept: FAMILY MEDICINE CLINIC | Age: 83
End: 2018-03-09
Payer: MEDICARE

## 2018-03-09 VITALS
WEIGHT: 182 LBS | OXYGEN SATURATION: 95 % | HEART RATE: 64 BPM | DIASTOLIC BLOOD PRESSURE: 80 MMHG | SYSTOLIC BLOOD PRESSURE: 130 MMHG | BODY MASS INDEX: 26.88 KG/M2

## 2018-03-09 DIAGNOSIS — B02.9 HERPES ZOSTER WITHOUT COMPLICATION: Primary | ICD-10-CM

## 2018-03-09 PROCEDURE — 1123F ACP DISCUSS/DSCN MKR DOCD: CPT | Performed by: FAMILY MEDICINE

## 2018-03-09 PROCEDURE — G8482 FLU IMMUNIZE ORDER/ADMIN: HCPCS | Performed by: FAMILY MEDICINE

## 2018-03-09 PROCEDURE — G8427 DOCREV CUR MEDS BY ELIG CLIN: HCPCS | Performed by: FAMILY MEDICINE

## 2018-03-09 PROCEDURE — 1036F TOBACCO NON-USER: CPT | Performed by: FAMILY MEDICINE

## 2018-03-09 PROCEDURE — 4040F PNEUMOC VAC/ADMIN/RCVD: CPT | Performed by: FAMILY MEDICINE

## 2018-03-09 PROCEDURE — G8598 ASA/ANTIPLAT THER USED: HCPCS | Performed by: FAMILY MEDICINE

## 2018-03-09 PROCEDURE — G8417 CALC BMI ABV UP PARAM F/U: HCPCS | Performed by: FAMILY MEDICINE

## 2018-03-09 PROCEDURE — 99213 OFFICE O/P EST LOW 20 MIN: CPT | Performed by: FAMILY MEDICINE

## 2018-03-09 RX ORDER — PREDNISONE 20 MG/1
TABLET ORAL
Qty: 10 TABLET | Refills: 0 | Status: SHIPPED | OUTPATIENT
Start: 2018-03-09 | End: 2018-03-15 | Stop reason: ALTCHOICE

## 2018-03-09 RX ORDER — CLINDAMYCIN HYDROCHLORIDE 300 MG/1
CAPSULE ORAL
Refills: 0 | COMMUNITY
Start: 2018-01-25

## 2018-03-09 RX ORDER — VALACYCLOVIR HYDROCHLORIDE 1 G/1
1000 TABLET, FILM COATED ORAL 3 TIMES DAILY
Qty: 21 TABLET | Refills: 0 | Status: SHIPPED | OUTPATIENT
Start: 2018-03-09 | End: 2018-06-28 | Stop reason: ALTCHOICE

## 2018-03-09 NOTE — PROGRESS NOTES
Visit Information    Have you changed or started any medications since your last visit including any over-the-counter medicines, vitamins, or herbal medicines? no   Have you stopped taking any of your medications? Is so, why? -  no  Are you having any side effects from any of your medications? - no    Have you seen any other physician or provider since your last visit?  no   Have you had any other diagnostic tests since your last visit? yes - labs   Have you been seen in the emergency room and/or had an admission in a hospital since we last saw you?  no   Have you had your routine dental cleaning in the past 6 months?  yes      Do you have an active MyChart account? If no, what is the barrier?   No    Patient Care Team:  Hernan Navarro MD as PCP - General (Family Medicine)  Hernan Navarro MD as PCP - S Attributed Provider  Amira Dubois MD (Cardiology)  Behzad Mckinley MD as Referring Physician (Cardiology)    Medical History Review  Past Medical, Family, and Social History reviewed and  contribute to the patient presenting condition    Health Maintenance   Topic Date Due    DTaP/Tdap/Td vaccine (1 - Tdap) 10/10/1943    Shingles Vaccine (1 of 2 - 2 Dose Series) 10/10/1974    Potassium monitoring  11/02/2018    Creatinine monitoring  11/02/2018    Flu vaccine  Completed    Pneumococcal low/med risk  Completed

## 2018-03-09 NOTE — PROGRESS NOTES
Subjective:  Bronson Cabrera presents for   Chief Complaint   Patient presents with    Rash     around rt. eye and rt. side of forehead. Also on scalp. no pain but itching. Noticed  Yesterday  Rash does look worse today. He describes as simple an irritation only    Has already seen the eye doc today and he said the eye looks good    Patient Active Problem List   Diagnosis    Hypertension    CAD (coronary artery disease)    Aortic regurgitation    Mitral regurgitation    Atrial fibrillation    Ascending aortic aneurysm    Valvular heart disease    Hyperlipidemia    COPD (chronic obstructive pulmonary disease) (HCC)    Diastolic dysfunction    Anemia    Fatigue    Dementia    Osteoarthritis       Objective:  Physical Exam   Vitals:   Vitals:    03/09/18 1603   BP: 130/80   Pulse: 64   SpO2: 95%   Weight: 182 lb (82.6 kg)     Wt Readings from Last 3 Encounters:   03/09/18 182 lb (82.6 kg)   12/13/17 184 lb (83.5 kg)   10/19/17 179 lb (81.2 kg)     Ht Readings from Last 3 Encounters:   12/13/17 5' 9\" (1.753 m)   05/18/17 5' 7\" (1.702 m)   10/15/15 5' 7\" (1.702 m)     Body mass index is 26.88 kg/m². Constitutional: He is oriented to person, place, and time. He appears well-developed and well-nourished and in no acute distress. Answers all my questions appropriately. Head: Normocephalic and atraumatic. Skin on right forehead, cheek and scalp has light red flat papules. No vesicles  Assessment:   Encounter Diagnosis   Name Primary?  Herpes zoster without complication Yes         Plan:   rx valtrex and prednsione. FU with eye doc as scheduled.     If significant change for wores (pain) call

## 2018-03-15 ENCOUNTER — OFFICE VISIT (OUTPATIENT)
Dept: FAMILY MEDICINE CLINIC | Age: 83
End: 2018-03-15
Payer: MEDICARE

## 2018-03-15 VITALS
HEART RATE: 71 BPM | DIASTOLIC BLOOD PRESSURE: 80 MMHG | BODY MASS INDEX: 26.58 KG/M2 | SYSTOLIC BLOOD PRESSURE: 146 MMHG | WEIGHT: 180 LBS

## 2018-03-15 DIAGNOSIS — B02.9 HERPES ZOSTER WITHOUT COMPLICATION: Primary | ICD-10-CM

## 2018-03-15 PROCEDURE — 1036F TOBACCO NON-USER: CPT | Performed by: FAMILY MEDICINE

## 2018-03-15 PROCEDURE — G8427 DOCREV CUR MEDS BY ELIG CLIN: HCPCS | Performed by: FAMILY MEDICINE

## 2018-03-15 PROCEDURE — G8417 CALC BMI ABV UP PARAM F/U: HCPCS | Performed by: FAMILY MEDICINE

## 2018-03-15 PROCEDURE — G8598 ASA/ANTIPLAT THER USED: HCPCS | Performed by: FAMILY MEDICINE

## 2018-03-15 PROCEDURE — 99213 OFFICE O/P EST LOW 20 MIN: CPT | Performed by: FAMILY MEDICINE

## 2018-03-15 PROCEDURE — 4040F PNEUMOC VAC/ADMIN/RCVD: CPT | Performed by: FAMILY MEDICINE

## 2018-03-15 PROCEDURE — 1123F ACP DISCUSS/DSCN MKR DOCD: CPT | Performed by: FAMILY MEDICINE

## 2018-03-15 PROCEDURE — G8482 FLU IMMUNIZE ORDER/ADMIN: HCPCS | Performed by: FAMILY MEDICINE

## 2018-03-15 ASSESSMENT — ENCOUNTER SYMPTOMS
CONSTIPATION: 0
COUGH: 0
ABDOMINAL PAIN: 0
WHEEZING: 0
BACK PAIN: 0
EYE PAIN: 0
EYE ITCHING: 1
DIARRHEA: 0
SHORTNESS OF BREATH: 0
BLOOD IN STOOL: 0

## 2018-03-15 NOTE — PROGRESS NOTES
No current facility-administered medications for this visit. Allergies   Allergen Reactions    Penicillins          Subjective:   Review of Systems   Constitutional: Negative for chills, diaphoresis, fatigue and fever. HENT: Negative for congestion and hearing loss. Eyes: Positive for itching. Negative for pain and visual disturbance. Respiratory: Negative for cough, shortness of breath and wheezing. Cardiovascular: Negative for chest pain, palpitations and leg swelling. Gastrointestinal: Negative for abdominal pain, blood in stool, constipation and diarrhea. Genitourinary: Negative for dysuria. Musculoskeletal: Negative for arthralgias, back pain, gait problem and neck pain. Skin: Negative for rash. Neurological: Negative for weakness, numbness and headaches. Psychiatric/Behavioral: Negative for dysphoric mood and sleep disturbance. Objective:   BP (!) 146/80   Pulse 71   Wt 180 lb (81.6 kg)   BMI 26.58 kg/m²     Physical Exam   Constitutional: He is oriented to person, place, and time. He appears well-developed and well-nourished. No distress. HENT:   Head: Normocephalic and atraumatic. Mouth/Throat: No oropharyngeal exudate. Eyes: Right eye exhibits no discharge. Left eye exhibits no discharge. No scleral icterus. Neck: Neck supple. Carotid bruit is not present. No thyromegaly present. Cardiovascular: Normal rate, regular rhythm and normal heart sounds. Exam reveals no gallop and no friction rub. No murmur heard. Pulmonary/Chest: Breath sounds normal. No respiratory distress. He has no wheezes. He has no rales. He exhibits no tenderness. Abdominal: There is no tenderness. Musculoskeletal: He exhibits no edema or tenderness. Lymphadenopathy:     He has no cervical adenopathy. Neurological: He is alert and oriented to person, place, and time. No cranial nerve deficit.  Coordination normal.   Skin: Rash (resolving zoster rash on forehead right side.) noted. He is not diaphoretic. Psychiatric: He has a normal mood and affect. His behavior is normal. Judgment and thought content normal.       Assessment:      1. Herpes zoster without complication           Plan:      Return if symptoms worsen or fail to improve. No orders of the defined types were placed in this encounter. No orders of the defined types were placed in this encounter. May follow up prn.

## 2018-04-05 ENCOUNTER — HOSPITAL ENCOUNTER (OUTPATIENT)
Age: 83
Discharge: HOME | End: 2018-04-05
Payer: MEDICARE

## 2018-04-05 LAB
INR BLD: 2.2
PROTHROMBIN TIME: 22 SEC (ref 9.7–12)

## 2018-04-05 PROCEDURE — 36415 COLL VENOUS BLD VENIPUNCTURE: CPT

## 2018-04-05 PROCEDURE — 85610 PROTHROMBIN TIME: CPT

## 2018-05-17 ENCOUNTER — HOSPITAL ENCOUNTER (OUTPATIENT)
Age: 83
Discharge: HOME | End: 2018-05-17
Payer: MEDICARE

## 2018-05-17 LAB
INR BLD: 2.2
PROTHROMBIN TIME: 22.1 SEC (ref 9.7–12)

## 2018-05-17 PROCEDURE — 85610 PROTHROMBIN TIME: CPT

## 2018-05-17 PROCEDURE — 36415 COLL VENOUS BLD VENIPUNCTURE: CPT

## 2018-06-25 RX ORDER — FUROSEMIDE 20 MG/1
TABLET ORAL
Qty: 112 TABLET | Refills: 3 | Status: SHIPPED | OUTPATIENT
Start: 2018-06-25 | End: 2018-10-16 | Stop reason: SDUPTHER

## 2018-06-27 ENCOUNTER — HOSPITAL ENCOUNTER (OUTPATIENT)
Age: 83
Discharge: HOME | End: 2018-06-27
Payer: MEDICARE

## 2018-06-27 LAB
INR BLD: 2.6
PROTHROMBIN TIME: 25.4 SEC (ref 9.7–12)

## 2018-06-27 PROCEDURE — 36415 COLL VENOUS BLD VENIPUNCTURE: CPT

## 2018-06-27 PROCEDURE — 85610 PROTHROMBIN TIME: CPT

## 2018-06-28 ENCOUNTER — OFFICE VISIT (OUTPATIENT)
Dept: FAMILY MEDICINE CLINIC | Age: 83
End: 2018-06-28
Payer: MEDICARE

## 2018-06-28 VITALS
BODY MASS INDEX: 25.99 KG/M2 | HEART RATE: 57 BPM | WEIGHT: 176 LBS | SYSTOLIC BLOOD PRESSURE: 130 MMHG | DIASTOLIC BLOOD PRESSURE: 60 MMHG

## 2018-06-28 DIAGNOSIS — I10 ESSENTIAL HYPERTENSION: ICD-10-CM

## 2018-06-28 DIAGNOSIS — L97.828 VENOUS STASIS ULCER OF OTHER PART OF LEFT LOWER LEG WITH OTHER ULCER SEVERITY WITH VARICOSE VEINS (HCC): Primary | ICD-10-CM

## 2018-06-28 DIAGNOSIS — I83.028 VENOUS STASIS ULCER OF OTHER PART OF LEFT LOWER LEG WITH OTHER ULCER SEVERITY WITH VARICOSE VEINS (HCC): Primary | ICD-10-CM

## 2018-06-28 DIAGNOSIS — L03.116 LEFT LEG CELLULITIS: ICD-10-CM

## 2018-06-28 PROCEDURE — 99213 OFFICE O/P EST LOW 20 MIN: CPT | Performed by: FAMILY MEDICINE

## 2018-06-28 PROCEDURE — 1123F ACP DISCUSS/DSCN MKR DOCD: CPT | Performed by: FAMILY MEDICINE

## 2018-06-28 PROCEDURE — 1036F TOBACCO NON-USER: CPT | Performed by: FAMILY MEDICINE

## 2018-06-28 PROCEDURE — G8427 DOCREV CUR MEDS BY ELIG CLIN: HCPCS | Performed by: FAMILY MEDICINE

## 2018-06-28 PROCEDURE — G8417 CALC BMI ABV UP PARAM F/U: HCPCS | Performed by: FAMILY MEDICINE

## 2018-06-28 PROCEDURE — 4040F PNEUMOC VAC/ADMIN/RCVD: CPT | Performed by: FAMILY MEDICINE

## 2018-06-28 PROCEDURE — G8598 ASA/ANTIPLAT THER USED: HCPCS | Performed by: FAMILY MEDICINE

## 2018-06-28 RX ORDER — PREDNISONE 20 MG/1
TABLET ORAL
Qty: 20 TABLET | Refills: 0 | Status: SHIPPED | OUTPATIENT
Start: 2018-06-28 | End: 2018-10-10 | Stop reason: ALTCHOICE

## 2018-06-28 RX ORDER — CEPHALEXIN 250 MG/1
250 CAPSULE ORAL 3 TIMES DAILY
Qty: 30 CAPSULE | Refills: 0 | Status: SHIPPED | OUTPATIENT
Start: 2018-06-28 | End: 2018-07-08

## 2018-06-28 RX ORDER — MOXIFLOXACIN HYDROCHLORIDE 400 MG/1
400 TABLET ORAL DAILY
Qty: 10 TABLET | Refills: 0 | Status: SHIPPED | OUTPATIENT
Start: 2018-06-28 | End: 2018-07-08

## 2018-06-28 ASSESSMENT — ENCOUNTER SYMPTOMS
WHEEZING: 0
BACK PAIN: 0
DIARRHEA: 0
ABDOMINAL PAIN: 0
CONSTIPATION: 0
COUGH: 0
SHORTNESS OF BREATH: 0
BLOOD IN STOOL: 0
COLOR CHANGE: 1

## 2018-06-28 ASSESSMENT — PATIENT HEALTH QUESTIONNAIRE - PHQ9
SUM OF ALL RESPONSES TO PHQ QUESTIONS 1-9: 0
2. FEELING DOWN, DEPRESSED OR HOPELESS: 0
1. LITTLE INTEREST OR PLEASURE IN DOING THINGS: 0
SUM OF ALL RESPONSES TO PHQ9 QUESTIONS 1 & 2: 0

## 2018-07-06 ENCOUNTER — OFFICE VISIT (OUTPATIENT)
Dept: FAMILY MEDICINE CLINIC | Age: 83
End: 2018-07-06
Payer: MEDICARE

## 2018-07-06 VITALS
HEART RATE: 60 BPM | WEIGHT: 168 LBS | DIASTOLIC BLOOD PRESSURE: 70 MMHG | BODY MASS INDEX: 24.81 KG/M2 | SYSTOLIC BLOOD PRESSURE: 130 MMHG

## 2018-07-06 DIAGNOSIS — I10 ESSENTIAL HYPERTENSION: ICD-10-CM

## 2018-07-06 DIAGNOSIS — I87.2 VENOUS STASIS DERMATITIS OF BOTH LOWER EXTREMITIES: Primary | ICD-10-CM

## 2018-07-06 DIAGNOSIS — H93.13 TINNITUS OF BOTH EARS: ICD-10-CM

## 2018-07-06 DIAGNOSIS — I48.20 CHRONIC ATRIAL FIBRILLATION (HCC): Chronic | ICD-10-CM

## 2018-07-06 DIAGNOSIS — I51.89 DIASTOLIC DYSFUNCTION: ICD-10-CM

## 2018-07-06 PROCEDURE — 1036F TOBACCO NON-USER: CPT | Performed by: FAMILY MEDICINE

## 2018-07-06 PROCEDURE — G8420 CALC BMI NORM PARAMETERS: HCPCS | Performed by: FAMILY MEDICINE

## 2018-07-06 PROCEDURE — G8598 ASA/ANTIPLAT THER USED: HCPCS | Performed by: FAMILY MEDICINE

## 2018-07-06 PROCEDURE — 1123F ACP DISCUSS/DSCN MKR DOCD: CPT | Performed by: FAMILY MEDICINE

## 2018-07-06 PROCEDURE — 4040F PNEUMOC VAC/ADMIN/RCVD: CPT | Performed by: FAMILY MEDICINE

## 2018-07-06 PROCEDURE — G8427 DOCREV CUR MEDS BY ELIG CLIN: HCPCS | Performed by: FAMILY MEDICINE

## 2018-07-06 PROCEDURE — 99213 OFFICE O/P EST LOW 20 MIN: CPT | Performed by: FAMILY MEDICINE

## 2018-07-06 ASSESSMENT — ENCOUNTER SYMPTOMS
BACK PAIN: 0
SHORTNESS OF BREATH: 0
ABDOMINAL PAIN: 0
CONSTIPATION: 0
DIARRHEA: 0
BLOOD IN STOOL: 0
COUGH: 0
WHEEZING: 0

## 2018-07-06 NOTE — PROGRESS NOTES
regurgitation     severe    Mitral valve prolapse     Tricuspid regurgitation     moderate      Past Surgical History:   Procedure Laterality Date    AORTIC VALVE REPLACEMENT      CARDIAC CATHETERIZATION  9/6/11    Dr. Jitendra Rawls  9/8/11    Dr. Adela Crawford History   Problem Relation Age of Onset    Heart Attack Mother     Heart Attack Sister     Hypertension Sister      Social History   Substance Use Topics    Smoking status: Never Smoker    Smokeless tobacco: Never Used    Alcohol use No      Current Outpatient Prescriptions   Medication Sig Dispense Refill    moxifloxacin (AVELOX) 400 MG tablet Take 1 tablet by mouth daily for 10 days 10 tablet 0    predniSONE (DELTASONE) 20 MG tablet Take 3 tabs daily for 3 days, then 2 a day for 3 days, then 1 a day for 3 days, then 1/2 a day for 3 days. 20 tablet 0    cephALEXin (KEFLEX) 250 MG capsule Take 1 capsule by mouth 3 times daily for 10 days 30 capsule 0    furosemide (LASIX) 20 MG tablet TAKE 2 TABLETS BY MOUTH TWICE DAILY 112 tablet 3    clindamycin (CLEOCIN) 300 MG capsule TK 2 CS PO 1 HOUR PRIOR TO APPOINTMENTS  0    lovastatin (MEVACOR) 20 MG tablet TAKE 1 TABLET BY MOUTH AT BEDTIME 28 tablet 11    donepezil (ARICEPT) 10 MG tablet TAKE 1 TABLET BY MOUTH EVERY NIGHT 90 tablet 3    Cholecalciferol (VITAMIN D3) 2000 UNITS CAPS Take 1 capsule by mouth daily      latanoprost (XALATAN) 0.005 % ophthalmic solution Place 1 drop into both eyes nightly 1 Bottle 4    metoprolol (LOPRESSOR) 25 MG tablet Take 12.5 mg by mouth 2 times daily      Tetrahydroz-Dextran-PEG-Povid (EYE DROPS ADVANCED RELIEF OP) Apply 1 drop to eye every morning.  Fish Oil-Cholecalciferol (FISH OIL + D3) 8023-0082 MG-UNIT CAPS Take 1 capsule by mouth 2 times daily.  warfarin (COUMADIN) 5 MG tablet Po qd 30 tablet 3    diltiazem (CARDIZEM CD) 120 MG ER capsule Take 1 capsule by mouth daily.  [id number] [address]sey Abdominal: There is no tenderness. Musculoskeletal: He exhibits deformity (wounds on LLE have resolved he continues with the dark pigment changes bronze edema. less edema and not tender. ). He exhibits no edema or tenderness. Lymphadenopathy:     He has no cervical adenopathy. Neurological: He is alert and oriented to person, place, and time. No cranial nerve deficit. Coordination normal.   Skin: No rash noted. He is not diaphoretic. Psychiatric: He has a normal mood and affect. His behavior is normal. Judgment and thought content normal.       Assessment:       Diagnosis Orders   1. Venous stasis dermatitis of both lower extremities     2. Essential hypertension     3. Diastolic dysfunction     4. Chronic atrial fibrillation (HCC)     5. Tinnitus of both ears           Plan:      Return in about 3 months (around 10/6/2018). No orders of the defined types were placed in this encounter. No orders of the defined types were placed in this encounter. We will try compression stockings during the day. Leg elevation when possible. See me in three months sooner prn.

## 2018-08-08 ENCOUNTER — HOSPITAL ENCOUNTER (OUTPATIENT)
Age: 83
Discharge: HOME | End: 2018-08-08
Payer: MEDICARE

## 2018-08-08 LAB
INR BLD: 2.8
PROTHROMBIN TIME: 27.6 SEC (ref 9.7–12)

## 2018-08-08 PROCEDURE — 85610 PROTHROMBIN TIME: CPT

## 2018-08-08 PROCEDURE — 36415 COLL VENOUS BLD VENIPUNCTURE: CPT

## 2018-09-19 ENCOUNTER — HOSPITAL ENCOUNTER (OUTPATIENT)
Age: 83
Discharge: HOME | End: 2018-09-19
Payer: MEDICARE

## 2018-09-19 LAB
INR BLD: 1.8
PROTHROMBIN TIME: 18.5 SEC (ref 9.7–12)

## 2018-09-19 PROCEDURE — 85610 PROTHROMBIN TIME: CPT

## 2018-09-19 PROCEDURE — 36415 COLL VENOUS BLD VENIPUNCTURE: CPT

## 2018-10-10 ENCOUNTER — OFFICE VISIT (OUTPATIENT)
Dept: FAMILY MEDICINE CLINIC | Age: 83
End: 2018-10-10
Payer: MEDICARE

## 2018-10-10 VITALS
BODY MASS INDEX: 27.78 KG/M2 | HEART RATE: 46 BPM | HEIGHT: 67 IN | OXYGEN SATURATION: 91 % | SYSTOLIC BLOOD PRESSURE: 102 MMHG | WEIGHT: 177 LBS | DIASTOLIC BLOOD PRESSURE: 78 MMHG

## 2018-10-10 DIAGNOSIS — Z23 NEED FOR PROPHYLACTIC VACCINATION AND INOCULATION AGAINST INFLUENZA: ICD-10-CM

## 2018-10-10 DIAGNOSIS — I25.10 CORONARY ARTERY DISEASE INVOLVING NATIVE CORONARY ARTERY OF NATIVE HEART WITHOUT ANGINA PECTORIS: Chronic | ICD-10-CM

## 2018-10-10 DIAGNOSIS — M19.90 OSTEOARTHRITIS, UNSPECIFIED OSTEOARTHRITIS TYPE, UNSPECIFIED SITE: Primary | Chronic | ICD-10-CM

## 2018-10-10 DIAGNOSIS — J41.0 SIMPLE CHRONIC BRONCHITIS (HCC): Chronic | ICD-10-CM

## 2018-10-10 DIAGNOSIS — I10 ESSENTIAL HYPERTENSION: ICD-10-CM

## 2018-10-10 DIAGNOSIS — I48.20 CHRONIC ATRIAL FIBRILLATION (HCC): Chronic | ICD-10-CM

## 2018-10-10 DIAGNOSIS — F03.90 DEMENTIA WITHOUT BEHAVIORAL DISTURBANCE, UNSPECIFIED DEMENTIA TYPE: ICD-10-CM

## 2018-10-10 PROCEDURE — 99213 OFFICE O/P EST LOW 20 MIN: CPT | Performed by: FAMILY MEDICINE

## 2018-10-10 PROCEDURE — G8427 DOCREV CUR MEDS BY ELIG CLIN: HCPCS | Performed by: FAMILY MEDICINE

## 2018-10-10 PROCEDURE — 1123F ACP DISCUSS/DSCN MKR DOCD: CPT | Performed by: FAMILY MEDICINE

## 2018-10-10 PROCEDURE — 1101F PT FALLS ASSESS-DOCD LE1/YR: CPT | Performed by: FAMILY MEDICINE

## 2018-10-10 PROCEDURE — 90662 IIV NO PRSV INCREASED AG IM: CPT | Performed by: FAMILY MEDICINE

## 2018-10-10 PROCEDURE — G0008 ADMIN INFLUENZA VIRUS VAC: HCPCS | Performed by: FAMILY MEDICINE

## 2018-10-10 PROCEDURE — G8482 FLU IMMUNIZE ORDER/ADMIN: HCPCS | Performed by: FAMILY MEDICINE

## 2018-10-10 PROCEDURE — 4040F PNEUMOC VAC/ADMIN/RCVD: CPT | Performed by: FAMILY MEDICINE

## 2018-10-10 PROCEDURE — G8598 ASA/ANTIPLAT THER USED: HCPCS | Performed by: FAMILY MEDICINE

## 2018-10-10 PROCEDURE — G8926 SPIRO NO PERF OR DOC: HCPCS | Performed by: FAMILY MEDICINE

## 2018-10-10 PROCEDURE — 3023F SPIROM DOC REV: CPT | Performed by: FAMILY MEDICINE

## 2018-10-10 PROCEDURE — 1036F TOBACCO NON-USER: CPT | Performed by: FAMILY MEDICINE

## 2018-10-10 PROCEDURE — G8417 CALC BMI ABV UP PARAM F/U: HCPCS | Performed by: FAMILY MEDICINE

## 2018-10-10 ASSESSMENT — ENCOUNTER SYMPTOMS
DIARRHEA: 0
SHORTNESS OF BREATH: 0
COUGH: 0
BLOOD IN STOOL: 0
WHEEZING: 0
BACK PAIN: 0
CONSTIPATION: 0
ABDOMINAL PAIN: 0

## 2018-10-10 NOTE — PATIENT INSTRUCTIONS
Patient Education        Influenza (Flu) Vaccine (Inactivated or Recombinant): What You Need to Know  Why get vaccinated? Influenza (\"flu\") is a contagious disease that spreads around the [place] every winter, usually between October and May. Flu is caused by influenza viruses and is spread mainly by coughing, sneezing, and close contact. Anyone can get flu. Flu strikes suddenly and can last several days. Symptoms vary by age, but can include:  · Fever/chills. · Sore throat. · Muscle aches. · Fatigue. · Cough. · Headache. · Runny or stuffy nose. Flu can also lead to pneumonia and blood infections, and cause diarrhea and seizures in children. If you have a medical condition, such as heart or lung disease, flu can make it worse. Flu is more dangerous for some people. Infants and young children, people 72years of age and older, pregnant women, and people with certain health conditions or a weakened immune system are at greatest risk. Each year thousands of people in the [facility] die from flu, and many more are hospitalized. Flu vaccine can:  · Keep you from getting flu. · Make flu less severe if you do get it. · Keep you from spreading flu to your family and other people. Inactivated and recombinant flu vaccines  A dose of flu vaccine is recommended every flu season. Children 6 months through 6years of age may need two doses during the same flu season. Everyone else needs only one dose each flu season. Some inactivated flu vaccines contain a very small amount of a mercury-based preservative called thimerosal. Studies have not shown thimerosal in vaccines to be harmful, but flu vaccines that do not contain thimerosal are available. There is no live flu virus in flu shots. They cannot cause the flu. There are many flu viruses, and they are always changing.  Each year a new flu vaccine is made to protect against three or four viruses that are likely to cause disease in the upcoming flu

## 2018-10-10 NOTE — PROGRESS NOTES
MG-UNIT CAPS Take 1 capsule by mouth 2 times daily.  warfarin (COUMADIN) 5 MG tablet Po qd 30 tablet 3    diltiazem (CARDIZEM CD) 120 MG ER capsule Take 1 capsule by mouth daily. 30 capsule 3    digoxin (LANOXIN) 125 MCG tablet Take 1 tablet by mouth daily. 30 tablet 3    midodrine (PROAMATINE) 5 MG tablet Take 1 tablet by mouth 3 times daily (with meals). 90 tablet 3    aspirin 81 MG tablet Take 81 mg by mouth daily.  clindamycin (CLEOCIN) 300 MG capsule TK 2 CS PO 1 HOUR PRIOR TO APPOINTMENTS  0     No current facility-administered medications for this visit. Allergies   Allergen Reactions    Penicillins          Visit Information    Have you changed or started any medications since your last visit including any over-the-counter medicines, vitamins, or herbal medicines? no   Are you having any side effects from any of your medications? -  no  Have you stopped taking any of your medications? Is so, why? -  no    Have you seen any other physician or provider since your last visit? Yes - Records Requested  Have you had any other diagnostic tests since your last visit? No  Have you been seen in the emergency room and/or had an admission to a hospital since we last saw you? No  Have you had your routine dental cleaning in the past 6 months? no    Have you activated your [employer] account? If not, what are your barriers?  Yes     Patient Care Team:  Kate Wei MD as PCP - General (Family Medicine)  Kate Wei MD as PCP - S Attributed Provider  Nishi Solis MD (Cardiology)  Sudarshan Leon MD as Referring Physician (Cardiology)    Medical History Review  Past Medical, Family, and Social History reviewed and does contribute to the patient presenting condition    Health Maintenance   Topic Date Due    DTaP/Tdap/Td vaccine (1 - Tdap) 10/10/1943    Shingles Vaccine (1 of 2 - 2 Dose Series) 10/10/1974    Potassium monitoring  11/02/2018    Creatinine monitoring  11/02/2018    Flu

## 2018-10-16 RX ORDER — FUROSEMIDE 20 MG/1
TABLET ORAL
Qty: 120 TABLET | Refills: 11 | Status: SHIPPED | OUTPATIENT
Start: 2018-10-16

## 2018-10-31 ENCOUNTER — HOSPITAL ENCOUNTER (OUTPATIENT)
Age: 83
Discharge: HOME | End: 2018-10-31
Payer: MEDICARE

## 2018-10-31 LAB
INR BLD: 2
PROTHROMBIN TIME: 20.1 SEC (ref 9.7–12)

## 2018-10-31 PROCEDURE — 36415 COLL VENOUS BLD VENIPUNCTURE: CPT

## 2018-10-31 PROCEDURE — 85610 PROTHROMBIN TIME: CPT

## 2018-12-17 ENCOUNTER — HOSPITAL ENCOUNTER (OUTPATIENT)
Age: 83
Discharge: HOME | End: 2018-12-17
Payer: MEDICARE

## 2018-12-17 LAB
INR BLD: 2
PROTHROMBIN TIME: 20.5 SEC (ref 9.7–12)

## 2018-12-17 PROCEDURE — 36415 COLL VENOUS BLD VENIPUNCTURE: CPT

## 2018-12-17 PROCEDURE — 85610 PROTHROMBIN TIME: CPT

## 2019-01-03 RX ORDER — LOVASTATIN 20 MG/1
TABLET ORAL
Qty: 28 TABLET | Refills: 0 | Status: SHIPPED | OUTPATIENT
Start: 2019-01-03 | End: 2019-01-31 | Stop reason: SDUPTHER

## 2019-01-24 RX ORDER — DONEPEZIL HYDROCHLORIDE 10 MG/1
TABLET, FILM COATED ORAL
Qty: 90 TABLET | Refills: 3 | Status: SHIPPED | OUTPATIENT
Start: 2019-01-24 | End: 2019-01-24 | Stop reason: SDUPTHER

## 2019-01-24 RX ORDER — DONEPEZIL HYDROCHLORIDE 10 MG/1
TABLET, FILM COATED ORAL
Qty: 90 TABLET | Refills: 3 | Status: SHIPPED | OUTPATIENT
Start: 2019-01-24

## 2019-01-31 RX ORDER — LOVASTATIN 20 MG/1
TABLET ORAL
Qty: 30 TABLET | Refills: 11 | Status: SHIPPED | OUTPATIENT
Start: 2019-01-31 | End: 2019-03-08 | Stop reason: SDUPTHER

## 2019-02-06 ENCOUNTER — HOSPITAL ENCOUNTER (OUTPATIENT)
Age: 84
Discharge: HOME | End: 2019-02-06
Payer: MEDICARE

## 2019-02-06 LAB
INR BLD: 2.4
PROTHROMBIN TIME: 26.1 SEC (ref 11.8–14.6)

## 2019-02-06 PROCEDURE — 36415 COLL VENOUS BLD VENIPUNCTURE: CPT

## 2019-02-06 PROCEDURE — 85610 PROTHROMBIN TIME: CPT

## 2019-03-08 ENCOUNTER — TELEPHONE (OUTPATIENT)
Dept: FAMILY MEDICINE CLINIC | Age: 84
End: 2019-03-08

## 2019-03-08 RX ORDER — LOVASTATIN 20 MG/1
TABLET ORAL
Qty: 90 TABLET | Refills: 3 | Status: SHIPPED | OUTPATIENT
Start: 2019-03-08

## 2019-03-20 ENCOUNTER — HOSPITAL ENCOUNTER (OUTPATIENT)
Age: 84
Discharge: HOME | End: 2019-03-20
Payer: MEDICARE

## 2019-03-20 LAB
INR BLD: 2
PROTHROMBIN TIME: 22.7 SEC (ref 11.8–14.6)

## 2019-03-20 PROCEDURE — 85610 PROTHROMBIN TIME: CPT

## 2019-03-20 PROCEDURE — 36415 COLL VENOUS BLD VENIPUNCTURE: CPT

## 2019-04-15 ENCOUNTER — APPOINTMENT (OUTPATIENT)
Dept: CT IMAGING | Age: 84
DRG: 964 | End: 2019-04-15
Payer: MEDICARE

## 2019-04-15 ENCOUNTER — APPOINTMENT (OUTPATIENT)
Dept: GENERAL RADIOLOGY | Age: 84
DRG: 964 | End: 2019-04-15
Payer: MEDICARE

## 2019-04-15 ENCOUNTER — HOSPITAL ENCOUNTER (INPATIENT)
Age: 84
LOS: 3 days | Discharge: NURSING, REHAB OR LONG-TERM CARE | DRG: 964 | End: 2019-04-18
Attending: EMERGENCY MEDICINE | Admitting: INTERNAL MEDICINE
Payer: MEDICARE

## 2019-04-15 DIAGNOSIS — I71.40 ABDOMINAL AORTIC ANEURYSM (AAA) WITHOUT RUPTURE: ICD-10-CM

## 2019-04-15 DIAGNOSIS — S27.321A CONTUSION OF RIGHT LUNG, INITIAL ENCOUNTER: ICD-10-CM

## 2019-04-15 DIAGNOSIS — E87.20 LACTIC ACIDOSIS: ICD-10-CM

## 2019-04-15 DIAGNOSIS — E86.0 DEHYDRATION: ICD-10-CM

## 2019-04-15 DIAGNOSIS — T79.6XXA TRAUMATIC RHABDOMYOLYSIS, INITIAL ENCOUNTER (HCC): Primary | ICD-10-CM

## 2019-04-15 DIAGNOSIS — S22.41XA CLOSED FRACTURE OF MULTIPLE RIBS OF RIGHT SIDE, INITIAL ENCOUNTER: ICD-10-CM

## 2019-04-15 PROBLEM — M62.82 RHABDOMYOLYSIS: Status: ACTIVE | Noted: 2019-04-15

## 2019-04-15 LAB
-: ABNORMAL
ABO/RH: NORMAL
ACT TEG: 175 SEC (ref 86–118)
ALLEN TEST: ABNORMAL
AMMONIA: 13 UMOL/L (ref 16–60)
AMORPHOUS: ABNORMAL
AMPHETAMINE SCREEN URINE: NEGATIVE
ANGLE, RAPID TEG: 76.6 DEG (ref 64–80)
ANION GAP SERPL CALCULATED.3IONS-SCNC: 15 MMOL/L (ref 9–17)
ANION GAP SERPL CALCULATED.3IONS-SCNC: 17 MMOL/L (ref 9–17)
ANION GAP: 19 MMOL/L (ref 7–16)
ANION GAP: 29 MMOL/L (ref 7–16)
ANTIBODY SCREEN: NEGATIVE
ARM BAND NUMBER: NORMAL
BACTERIA: ABNORMAL
BARBITURATE SCREEN URINE: NEGATIVE
BENZODIAZEPINE SCREEN, URINE: NEGATIVE
BILIRUBIN URINE: NEGATIVE
BLOOD BANK SPECIMEN: ABNORMAL
BNP INTERPRETATION: ABNORMAL
BUN BLDV-MCNC: 55 MG/DL (ref 8–23)
BUN BLDV-MCNC: 55 MG/DL (ref 8–23)
BUN/CREAT BLD: ABNORMAL (ref 9–20)
BUPRENORPHINE URINE: NORMAL
CALCIUM SERPL-MCNC: 8.7 MG/DL (ref 8.6–10.4)
CANNABINOID SCREEN URINE: NEGATIVE
CARBOXYHEMOGLOBIN: 1.1 % (ref 0–5)
CASTS UA: ABNORMAL /LPF (ref 0–2)
CHLORIDE BLD-SCNC: 102 MMOL/L (ref 98–107)
CHLORIDE BLD-SCNC: 106 MMOL/L (ref 98–107)
CO2: 21 MMOL/L (ref 20–31)
CO2: 24 MMOL/L (ref 20–31)
COCAINE METABOLITE, URINE: NEGATIVE
COLOR: YELLOW
CREAT SERPL-MCNC: 1.06 MG/DL (ref 0.7–1.2)
CREAT SERPL-MCNC: 1.23 MG/DL (ref 0.7–1.2)
CRYSTALS, UA: ABNORMAL /HPF
DIGOXIN DATE LAST DOSE: ABNORMAL
DIGOXIN DOSE AMOUNT: ABNORMAL
DIGOXIN DOSE TIME: ABNORMAL
DIGOXIN LEVEL: <0.3 NG/ML (ref 0.5–2)
EPITHELIAL CELLS UA: ABNORMAL /HPF (ref 0–5)
EPL-TEG: 0 % (ref 0–15)
ETHANOL PERCENT: <0.01 %
ETHANOL: <10 MG/DL
EXPIRATION DATE: NORMAL
FIO2: ABNORMAL
GFR AFRICAN AMERICAN: >60 ML/MIN
GFR AFRICAN AMERICAN: ABNORMAL ML/MIN
GFR NON-AFRICAN AMERICAN: >60 ML/MIN
GFR NON-AFRICAN AMERICAN: ABNORMAL ML/MIN
GFR NON-AFRICAN AMERICAN: ABNORMAL ML/MIN
GFR NON-AFRICAN AMERICAN: NORMAL ML/MIN
GFR SERPL CREATININE-BSD FRML MDRD: ABNORMAL ML/MIN
GFR SERPL CREATININE-BSD FRML MDRD: ABNORMAL ML/MIN/{1.73_M2}
GFR SERPL CREATININE-BSD FRML MDRD: NORMAL ML/MIN
GFR SERPL CREATININE-BSD FRML MDRD: NORMAL ML/MIN/{1.73_M2}
GLUCOSE BLD-MCNC: 105 MG/DL (ref 70–99)
GLUCOSE BLD-MCNC: 121 MG/DL (ref 70–99)
GLUCOSE BLD-MCNC: 73 MG/DL (ref 74–100)
GLUCOSE BLD-MCNC: 75 MG/DL (ref 75–110)
GLUCOSE BLD-MCNC: 96 MG/DL (ref 75–110)
GLUCOSE BLD-MCNC: <20 MG/DL (ref 74–100)
GLUCOSE URINE: NEGATIVE
HCG QUALITATIVE: ABNORMAL
HCO3 VENOUS: 13.9 MMOL/L (ref 22–29)
HCO3 VENOUS: 22.6 MMOL/L (ref 24–30)
HCO3 VENOUS: 3.6 MMOL/L (ref 22–29)
HCT VFR BLD CALC: 35.8 % (ref 40.7–50.3)
HCT VFR BLD CALC: 44.3 % (ref 40.7–50.3)
HEMOGLOBIN: 11.4 G/DL (ref 13–17)
HEMOGLOBIN: 14 G/DL (ref 13–17)
HEPARIN THERAPY: ABNORMAL
INR BLD: 2.5
KETONES, URINE: ABNORMAL
KINETICS RAPID TEG: 0.9 MIN (ref 1–2)
LACTIC ACID, WHOLE BLOOD: 19 MMOL/L (ref 0.7–2.1)
LACTIC ACID, WHOLE BLOOD: 3.2 MMOL/L (ref 0.7–2.1)
LACTIC ACID, WHOLE BLOOD: 4.9 MMOL/L (ref 0.7–2.1)
LEUKOCYTE ESTERASE, URINE: NEGATIVE
LY30 (LYSIS) TEG: 0 % (ref 0–8)
MA(MAX CLOT) RAPID TEG: 78.4 MM (ref 52–71)
MAGNESIUM: 2.5 MG/DL (ref 1.6–2.6)
MCH RBC QN AUTO: 30.1 PG (ref 25.2–33.5)
MCHC RBC AUTO-ENTMCNC: 31.6 G/DL (ref 28.4–34.8)
MCV RBC AUTO: 95.3 FL (ref 82.6–102.9)
MDMA URINE: NORMAL
METHADONE SCREEN, URINE: NEGATIVE
METHAMPHETAMINE, URINE: NORMAL
METHEMOGLOBIN: ABNORMAL % (ref 0–1.5)
MODE: ABNORMAL
MUCUS: ABNORMAL
MYOGLOBIN: 1037 NG/ML (ref 28–72)
MYOGLOBIN: 1691 NG/ML (ref 28–72)
MYOGLOBIN: 1935 NG/ML (ref 28–72)
MYOGLOBIN: 3532 NG/ML (ref 28–72)
NEGATIVE BASE EXCESS, VEN: 11 (ref 0–2)
NEGATIVE BASE EXCESS, VEN: 22 (ref 0–2)
NEGATIVE BASE EXCESS, VEN: 3.6 MMOL/L (ref 0–2)
NITRITE, URINE: NEGATIVE
NOTIFICATION TIME: ABNORMAL
NOTIFICATION: ABNORMAL
NRBC AUTOMATED: 0 PER 100 WBC
O2 DEVICE/FLOW/%: ABNORMAL
O2 SAT, VEN: 22.9 % (ref 60–85)
O2 SAT, VEN: 33 % (ref 60–85)
O2 SAT, VEN: 52 % (ref 60–85)
OPIATES, URINE: NEGATIVE
OTHER OBSERVATIONS UA: ABNORMAL
OXYCODONE SCREEN URINE: NEGATIVE
OXYHEMOGLOBIN: ABNORMAL % (ref 95–98)
PARTIAL THROMBOPLASTIN TIME: 28.3 SEC (ref 20.5–30.5)
PATIENT TEMP: 37
PATIENT TEMP: ABNORMAL
PATIENT TEMP: ABNORMAL
PCO2, VEN, TEMP ADJ: ABNORMAL MMHG (ref 39–55)
PCO2, VEN: 28.4 MM HG (ref 41–51)
PCO2, VEN: 47.4 (ref 39–55)
PCO2, VEN: 7.9 MM HG (ref 41–51)
PDW BLD-RTO: 15.9 % (ref 11.8–14.4)
PEEP/CPAP: ABNORMAL
PH UA: 5 (ref 5–8)
PH VENOUS: 7.26 (ref 7.32–7.43)
PH VENOUS: 7.3 (ref 7.32–7.42)
PH VENOUS: 7.3 (ref 7.32–7.43)
PH, VEN, TEMP ADJ: ABNORMAL (ref 7.32–7.42)
PHENCYCLIDINE, URINE: NEGATIVE
PHOSPHORUS: 4.3 MG/DL (ref 2.5–4.5)
PLATELET # BLD: 205 K/UL (ref 138–453)
PMV BLD AUTO: 11.4 FL (ref 8.1–13.5)
PO2, VEN, TEMP ADJ: ABNORMAL MMHG (ref 30–50)
PO2, VEN: 21.2 (ref 30–50)
PO2, VEN: 22.3 MM HG (ref 30–50)
PO2, VEN: 30 MM HG (ref 30–50)
POC CHLORIDE: 106 MMOL/L (ref 98–107)
POC CHLORIDE: 112 MMOL/L (ref 98–107)
POC CREATININE: 0.6 MG/DL (ref 0.51–1.19)
POC CREATININE: <0.3 MG/DL (ref 0.51–1.19)
POC HEMATOCRIT: 25 % (ref 41–53)
POC HEMATOCRIT: <10 % (ref 41–53)
POC HEMOGLOBIN: 8.6 G/DL (ref 13.5–17.5)
POC HEMOGLOBIN: ABNORMAL G/DL (ref 13.5–17.5)
POC IONIZED CALCIUM: 0.86 MMOL/L (ref 1.15–1.33)
POC IONIZED CALCIUM: 1.08 MMOL/L (ref 1.15–1.33)
POC LACTIC ACID: 11.07 MMOL/L (ref 0.56–1.39)
POC LACTIC ACID: 14.72 MMOL/L (ref 0.56–1.39)
POC PCO2 TEMP: ABNORMAL MM HG
POC PCO2 TEMP: ABNORMAL MM HG
POC PH TEMP: ABNORMAL
POC PH TEMP: ABNORMAL
POC PO2 TEMP: ABNORMAL MM HG
POC PO2 TEMP: ABNORMAL MM HG
POC POTASSIUM: 2.5 MMOL/L (ref 3.5–4.5)
POC POTASSIUM: 4.2 MMOL/L (ref 3.5–4.5)
POC SODIUM: 139 MMOL/L (ref 138–146)
POC SODIUM: 145 MMOL/L (ref 138–146)
POSITIVE BASE EXCESS, VEN: ABNORMAL (ref 0–3)
POSITIVE BASE EXCESS, VEN: ABNORMAL (ref 0–3)
POSITIVE BASE EXCESS, VEN: ABNORMAL MMOL/L (ref 0–2)
POTASSIUM SERPL-SCNC: 4.1 MMOL/L (ref 3.7–5.3)
POTASSIUM SERPL-SCNC: 4.7 MMOL/L (ref 3.7–5.3)
PRO-BNP: 2413 PG/ML
PROPOXYPHENE, URINE: NORMAL
PROTEIN UA: ABNORMAL
PROTHROMBIN TIME: 24.5 SEC (ref 9–12)
PSV: ABNORMAL
PT. POSITION: ABNORMAL
RBC # BLD: 4.65 M/UL (ref 4.21–5.77)
RBC UA: ABNORMAL /HPF (ref 0–2)
REACTION TIME RAPID TEG: 1.3 MIN (ref 0–1)
RENAL EPITHELIAL, UA: ABNORMAL /HPF
RESPIRATORY RATE: ABNORMAL
SAMPLE SITE: ABNORMAL
SET RATE: ABNORMAL
SODIUM BLD-SCNC: 140 MMOL/L (ref 135–144)
SODIUM BLD-SCNC: 145 MMOL/L (ref 135–144)
SPECIFIC GRAVITY UA: 1.04 (ref 1–1.03)
TEG COMMENT: ABNORMAL
TEST INFORMATION: NORMAL
TEXT FOR RESPIRATORY: ABNORMAL
TOTAL CK: 1447 U/L (ref 39–308)
TOTAL CK: 1688 U/L (ref 39–308)
TOTAL CK: 2372 U/L (ref 39–308)
TOTAL CK: 559 U/L (ref 39–308)
TOTAL CO2, VENOUS: 15 MMOL/L (ref 23–30)
TOTAL CO2, VENOUS: <5 MMOL/L (ref 23–30)
TOTAL HB: ABNORMAL G/DL (ref 12–16)
TOTAL RATE: ABNORMAL
TRICHOMONAS: ABNORMAL
TRICYCLIC ANTIDEPRESSANTS, UR: NORMAL
TROPONIN INTERP: ABNORMAL
TROPONIN INTERP: ABNORMAL
TROPONIN T: ABNORMAL NG/ML
TROPONIN T: ABNORMAL NG/ML
TROPONIN, HIGH SENSITIVITY: 100 NG/L (ref 0–22)
TROPONIN, HIGH SENSITIVITY: 28 NG/L (ref 0–22)
TURBIDITY: CLEAR
URINE HGB: ABNORMAL
UROBILINOGEN, URINE: NORMAL
VT: ABNORMAL
WBC # BLD: 10.8 K/UL (ref 3.5–11.3)
WBC UA: ABNORMAL /HPF (ref 0–5)
YEAST: ABNORMAL

## 2019-04-15 PROCEDURE — 84484 ASSAY OF TROPONIN QUANT: CPT

## 2019-04-15 PROCEDURE — 87641 MR-STAPH DNA AMP PROBE: CPT

## 2019-04-15 PROCEDURE — 80048 BASIC METABOLIC PNL TOTAL CA: CPT

## 2019-04-15 PROCEDURE — 82565 ASSAY OF CREATININE: CPT

## 2019-04-15 PROCEDURE — 82140 ASSAY OF AMMONIA: CPT

## 2019-04-15 PROCEDURE — 99291 CRITICAL CARE FIRST HOUR: CPT | Performed by: INTERNAL MEDICINE

## 2019-04-15 PROCEDURE — 85014 HEMATOCRIT: CPT

## 2019-04-15 PROCEDURE — 87086 URINE CULTURE/COLONY COUNT: CPT

## 2019-04-15 PROCEDURE — 96361 HYDRATE IV INFUSION ADD-ON: CPT

## 2019-04-15 PROCEDURE — 85390 FIBRINOLYSINS SCREEN I&R: CPT

## 2019-04-15 PROCEDURE — 83605 ASSAY OF LACTIC ACID: CPT

## 2019-04-15 PROCEDURE — 82746 ASSAY OF FOLIC ACID SERUM: CPT

## 2019-04-15 PROCEDURE — 72128 CT CHEST SPINE W/O DYE: CPT

## 2019-04-15 PROCEDURE — 71045 X-RAY EXAM CHEST 1 VIEW: CPT

## 2019-04-15 PROCEDURE — 84100 ASSAY OF PHOSPHORUS: CPT

## 2019-04-15 PROCEDURE — 83874 ASSAY OF MYOGLOBIN: CPT

## 2019-04-15 PROCEDURE — 2500000003 HC RX 250 WO HCPCS: Performed by: EMERGENCY MEDICINE

## 2019-04-15 PROCEDURE — 82550 ASSAY OF CK (CPK): CPT

## 2019-04-15 PROCEDURE — 74177 CT ABD & PELVIS W/CONTRAST: CPT

## 2019-04-15 PROCEDURE — 82330 ASSAY OF CALCIUM: CPT

## 2019-04-15 PROCEDURE — 6370000000 HC RX 637 (ALT 250 FOR IP): Performed by: STUDENT IN AN ORGANIZED HEALTH CARE EDUCATION/TRAINING PROGRAM

## 2019-04-15 PROCEDURE — 86900 BLOOD TYPING SEROLOGIC ABO: CPT

## 2019-04-15 PROCEDURE — 2000000000 HC ICU R&B

## 2019-04-15 PROCEDURE — 70450 CT HEAD/BRAIN W/O DYE: CPT

## 2019-04-15 PROCEDURE — 86901 BLOOD TYPING SEROLOGIC RH(D): CPT

## 2019-04-15 PROCEDURE — 94762 N-INVAS EAR/PLS OXIMTRY CONT: CPT

## 2019-04-15 PROCEDURE — 6360000002 HC RX W HCPCS: Performed by: EMERGENCY MEDICINE

## 2019-04-15 PROCEDURE — 80162 ASSAY OF DIGOXIN TOTAL: CPT

## 2019-04-15 PROCEDURE — APPSS60 APP SPLIT SHARED TIME 46-60 MINUTES: Performed by: NURSE PRACTITIONER

## 2019-04-15 PROCEDURE — 2580000003 HC RX 258: Performed by: EMERGENCY MEDICINE

## 2019-04-15 PROCEDURE — 96365 THER/PROPH/DIAG IV INF INIT: CPT

## 2019-04-15 PROCEDURE — 85210 CLOT FACTOR II PROTHROM SPEC: CPT

## 2019-04-15 PROCEDURE — 85018 HEMOGLOBIN: CPT

## 2019-04-15 PROCEDURE — 2700000000 HC OXYGEN THERAPY PER DAY

## 2019-04-15 PROCEDURE — 81001 URINALYSIS AUTO W/SCOPE: CPT

## 2019-04-15 PROCEDURE — 73080 X-RAY EXAM OF ELBOW: CPT

## 2019-04-15 PROCEDURE — 83735 ASSAY OF MAGNESIUM: CPT

## 2019-04-15 PROCEDURE — 99285 EMERGENCY DEPT VISIT HI MDM: CPT

## 2019-04-15 PROCEDURE — 85610 PROTHROMBIN TIME: CPT

## 2019-04-15 PROCEDURE — 85027 COMPLETE CBC AUTOMATED: CPT

## 2019-04-15 PROCEDURE — 84295 ASSAY OF SERUM SODIUM: CPT

## 2019-04-15 PROCEDURE — 80307 DRUG TEST PRSMV CHEM ANLYZR: CPT

## 2019-04-15 PROCEDURE — 87040 BLOOD CULTURE FOR BACTERIA: CPT

## 2019-04-15 PROCEDURE — 72125 CT NECK SPINE W/O DYE: CPT

## 2019-04-15 PROCEDURE — G0480 DRUG TEST DEF 1-7 CLASSES: HCPCS

## 2019-04-15 PROCEDURE — 80051 ELECTROLYTE PANEL: CPT

## 2019-04-15 PROCEDURE — 82947 ASSAY GLUCOSE BLOOD QUANT: CPT

## 2019-04-15 PROCEDURE — 85730 THROMBOPLASTIN TIME PARTIAL: CPT

## 2019-04-15 PROCEDURE — 84703 CHORIONIC GONADOTROPIN ASSAY: CPT

## 2019-04-15 PROCEDURE — 72131 CT LUMBAR SPINE W/O DYE: CPT

## 2019-04-15 PROCEDURE — 86850 RBC ANTIBODY SCREEN: CPT

## 2019-04-15 PROCEDURE — 51798 US URINE CAPACITY MEASURE: CPT

## 2019-04-15 PROCEDURE — 82607 VITAMIN B-12: CPT

## 2019-04-15 PROCEDURE — 36415 COLL VENOUS BLD VENIPUNCTURE: CPT

## 2019-04-15 PROCEDURE — 82435 ASSAY OF BLOOD CHLORIDE: CPT

## 2019-04-15 PROCEDURE — 73090 X-RAY EXAM OF FOREARM: CPT

## 2019-04-15 PROCEDURE — 93005 ELECTROCARDIOGRAM TRACING: CPT

## 2019-04-15 PROCEDURE — 2580000003 HC RX 258: Performed by: STUDENT IN AN ORGANIZED HEALTH CARE EDUCATION/TRAINING PROGRAM

## 2019-04-15 PROCEDURE — 6360000004 HC RX CONTRAST MEDICATION: Performed by: EMERGENCY MEDICINE

## 2019-04-15 PROCEDURE — 83880 ASSAY OF NATRIURETIC PEPTIDE: CPT

## 2019-04-15 PROCEDURE — 82805 BLOOD GASES W/O2 SATURATION: CPT

## 2019-04-15 PROCEDURE — 84520 ASSAY OF UREA NITROGEN: CPT

## 2019-04-15 PROCEDURE — 84132 ASSAY OF SERUM POTASSIUM: CPT

## 2019-04-15 PROCEDURE — 82803 BLOOD GASES ANY COMBINATION: CPT

## 2019-04-15 RX ORDER — DEXTROSE MONOHYDRATE 25 G/50ML
25 INJECTION, SOLUTION INTRAVENOUS ONCE
Status: DISCONTINUED | OUTPATIENT
Start: 2019-04-15 | End: 2019-04-15

## 2019-04-15 RX ORDER — LIDOCAINE 4 G/G
1 PATCH TOPICAL DAILY
Status: DISCONTINUED | OUTPATIENT
Start: 2019-04-15 | End: 2019-04-15

## 2019-04-15 RX ORDER — ONDANSETRON 2 MG/ML
4 INJECTION INTRAMUSCULAR; INTRAVENOUS EVERY 6 HOURS PRN
Status: DISCONTINUED | OUTPATIENT
Start: 2019-04-15 | End: 2019-04-18 | Stop reason: HOSPADM

## 2019-04-15 RX ORDER — MAGNESIUM SULFATE 1 G/100ML
1 INJECTION INTRAVENOUS PRN
Status: DISCONTINUED | OUTPATIENT
Start: 2019-04-15 | End: 2019-04-18 | Stop reason: HOSPADM

## 2019-04-15 RX ORDER — FUROSEMIDE 40 MG/1
40 TABLET ORAL 2 TIMES DAILY
COMMUNITY

## 2019-04-15 RX ORDER — ASPIRIN 81 MG/1
81 TABLET ORAL EVERY EVENING
COMMUNITY

## 2019-04-15 RX ORDER — FENTANYL CITRATE 50 UG/ML
25 INJECTION, SOLUTION INTRAMUSCULAR; INTRAVENOUS
Status: DISCONTINUED | OUTPATIENT
Start: 2019-04-15 | End: 2019-04-18 | Stop reason: HOSPADM

## 2019-04-15 RX ORDER — 0.9 % SODIUM CHLORIDE 0.9 %
500 INTRAVENOUS SOLUTION INTRAVENOUS ONCE
Status: COMPLETED | OUTPATIENT
Start: 2019-04-15 | End: 2019-04-15

## 2019-04-15 RX ORDER — WARFARIN SODIUM 5 MG/1
7.5 TABLET ORAL
COMMUNITY

## 2019-04-15 RX ORDER — SODIUM CHLORIDE 0.9 % (FLUSH) 0.9 %
10 SYRINGE (ML) INJECTION EVERY 12 HOURS SCHEDULED
Status: DISCONTINUED | OUTPATIENT
Start: 2019-04-15 | End: 2019-04-17

## 2019-04-15 RX ORDER — DEXTROSE MONOHYDRATE 25 G/50ML
12.5 INJECTION, SOLUTION INTRAVENOUS PRN
Status: DISCONTINUED | OUTPATIENT
Start: 2019-04-15 | End: 2019-04-18 | Stop reason: HOSPADM

## 2019-04-15 RX ORDER — 0.9 % SODIUM CHLORIDE 0.9 %
2000 INTRAVENOUS SOLUTION INTRAVENOUS ONCE
Status: COMPLETED | OUTPATIENT
Start: 2019-04-15 | End: 2019-04-15

## 2019-04-15 RX ORDER — ONDANSETRON 2 MG/ML
4 INJECTION INTRAMUSCULAR; INTRAVENOUS EVERY 8 HOURS PRN
Status: DISCONTINUED | OUTPATIENT
Start: 2019-04-15 | End: 2019-04-17

## 2019-04-15 RX ORDER — LIDOCAINE 4 G/G
2 PATCH TOPICAL DAILY
Status: DISCONTINUED | OUTPATIENT
Start: 2019-04-16 | End: 2019-04-18 | Stop reason: HOSPADM

## 2019-04-15 RX ORDER — DEXTROSE MONOHYDRATE 50 MG/ML
100 INJECTION, SOLUTION INTRAVENOUS PRN
Status: DISCONTINUED | OUTPATIENT
Start: 2019-04-15 | End: 2019-04-18 | Stop reason: HOSPADM

## 2019-04-15 RX ORDER — LOVASTATIN 20 MG/1
20 TABLET ORAL NIGHTLY
COMMUNITY

## 2019-04-15 RX ORDER — AMOXICILLIN 500 MG
1 CAPSULE ORAL 2 TIMES DAILY
COMMUNITY

## 2019-04-15 RX ORDER — 0.9 % SODIUM CHLORIDE 0.9 %
500 INTRAVENOUS SOLUTION INTRAVENOUS ONCE
Status: DISCONTINUED | OUTPATIENT
Start: 2019-04-15 | End: 2019-04-18 | Stop reason: HOSPADM

## 2019-04-15 RX ORDER — NICOTINE POLACRILEX 4 MG
15 LOZENGE BUCCAL PRN
Status: DISCONTINUED | OUTPATIENT
Start: 2019-04-15 | End: 2019-04-18 | Stop reason: HOSPADM

## 2019-04-15 RX ORDER — POTASSIUM CHLORIDE 7.45 MG/ML
10 INJECTION INTRAVENOUS PRN
Status: DISCONTINUED | OUTPATIENT
Start: 2019-04-15 | End: 2019-04-18 | Stop reason: HOSPADM

## 2019-04-15 RX ORDER — ACETAMINOPHEN 325 MG/1
650 TABLET ORAL EVERY 4 HOURS PRN
Status: DISCONTINUED | OUTPATIENT
Start: 2019-04-15 | End: 2019-04-18 | Stop reason: HOSPADM

## 2019-04-15 RX ORDER — AMOXICILLIN 500 MG
1 CAPSULE ORAL 2 TIMES DAILY
Status: DISCONTINUED | OUTPATIENT
Start: 2019-04-15 | End: 2019-04-15

## 2019-04-15 RX ORDER — SIMVASTATIN 10 MG
10 TABLET ORAL NIGHTLY
Status: DISCONTINUED | OUTPATIENT
Start: 2019-04-15 | End: 2019-04-18 | Stop reason: HOSPADM

## 2019-04-15 RX ORDER — SODIUM CHLORIDE 0.9 % (FLUSH) 0.9 %
10 SYRINGE (ML) INJECTION EVERY 12 HOURS SCHEDULED
Status: DISCONTINUED | OUTPATIENT
Start: 2019-04-15 | End: 2019-04-18 | Stop reason: HOSPADM

## 2019-04-15 RX ORDER — DIGOXIN 125 MCG
125 TABLET ORAL DAILY
COMMUNITY

## 2019-04-15 RX ORDER — DONEPEZIL HYDROCHLORIDE 10 MG/1
10 TABLET, FILM COATED ORAL NIGHTLY
COMMUNITY

## 2019-04-15 RX ORDER — DONEPEZIL HYDROCHLORIDE 10 MG/1
10 TABLET, FILM COATED ORAL NIGHTLY
Status: DISCONTINUED | OUTPATIENT
Start: 2019-04-15 | End: 2019-04-18 | Stop reason: HOSPADM

## 2019-04-15 RX ORDER — SODIUM CHLORIDE 9 MG/ML
INJECTION, SOLUTION INTRAVENOUS CONTINUOUS
Status: DISCONTINUED | OUTPATIENT
Start: 2019-04-15 | End: 2019-04-16

## 2019-04-15 RX ORDER — MIDODRINE HYDROCHLORIDE 5 MG/1
5 TABLET ORAL 3 TIMES DAILY
Status: DISCONTINUED | OUTPATIENT
Start: 2019-04-15 | End: 2019-04-18 | Stop reason: HOSPADM

## 2019-04-15 RX ORDER — DILTIAZEM HYDROCHLORIDE 120 MG/1
120 CAPSULE, EXTENDED RELEASE ORAL DAILY
COMMUNITY

## 2019-04-15 RX ORDER — MIDODRINE HYDROCHLORIDE 5 MG/1
5 TABLET ORAL 3 TIMES DAILY
COMMUNITY

## 2019-04-15 RX ORDER — SODIUM CHLORIDE 0.9 % (FLUSH) 0.9 %
10 SYRINGE (ML) INJECTION PRN
Status: DISCONTINUED | OUTPATIENT
Start: 2019-04-15 | End: 2019-04-18 | Stop reason: HOSPADM

## 2019-04-15 RX ORDER — SODIUM CHLORIDE 0.9 % (FLUSH) 0.9 %
10 SYRINGE (ML) INJECTION PRN
Status: DISCONTINUED | OUTPATIENT
Start: 2019-04-15 | End: 2019-04-17

## 2019-04-15 RX ADMIN — Medication 10 ML: at 20:53

## 2019-04-15 RX ADMIN — SODIUM CHLORIDE 2000 ML: 9 INJECTION, SOLUTION INTRAVENOUS at 12:08

## 2019-04-15 RX ADMIN — SODIUM CHLORIDE 500 ML: 9 INJECTION, SOLUTION INTRAVENOUS at 20:53

## 2019-04-15 RX ADMIN — VITAMIN D, TAB 1000IU (100/BT) 2000 UNITS: 25 TAB at 20:51

## 2019-04-15 RX ADMIN — SIMVASTATIN 10 MG: 10 TABLET, FILM COATED ORAL at 20:51

## 2019-04-15 RX ADMIN — Medication 1250 MG: at 14:16

## 2019-04-15 RX ADMIN — PIPERACILLIN AND TAZOBACTAM 3.38 G: 3; .375 INJECTION, POWDER, LYOPHILIZED, FOR SOLUTION INTRAVENOUS; PARENTERAL at 13:30

## 2019-04-15 RX ADMIN — DILTIAZEM HYDROCHLORIDE 2.5 MG/HR: 5 INJECTION INTRAVENOUS at 16:36

## 2019-04-15 RX ADMIN — SODIUM CHLORIDE: 9 INJECTION, SOLUTION INTRAVENOUS at 17:37

## 2019-04-15 RX ADMIN — DONEPEZIL HYDROCHLORIDE 10 MG: 10 TABLET, FILM COATED ORAL at 20:51

## 2019-04-15 RX ADMIN — SODIUM CHLORIDE 500 ML: 9 INJECTION, SOLUTION INTRAVENOUS at 22:00

## 2019-04-15 RX ADMIN — IOHEXOL 130 ML: 350 INJECTION, SOLUTION INTRAVENOUS at 12:58

## 2019-04-15 RX ADMIN — MIDODRINE HYDROCHLORIDE 5 MG: 5 TABLET ORAL at 19:17

## 2019-04-15 ASSESSMENT — ENCOUNTER SYMPTOMS
ABDOMINAL PAIN: 1
COLOR CHANGE: 0
SHORTNESS OF BREATH: 0
VOMITING: 0
DIARRHEA: 0
NAUSEA: 0
CHEST TIGHTNESS: 0

## 2019-04-15 ASSESSMENT — PAIN SCALES - PAIN ASSESSMENT IN ADVANCED DEMENTIA (PAINAD)
NEGVOCALIZATION: 1
BREATHING: 1
CONSOLABILITY: 0
NEGVOCALIZATION: 1
CONSOLABILITY: 0
NEGVOCALIZATION: 1
CONSOLABILITY: 0
FACIALEXPRESSION: 0
FACIALEXPRESSION: 0
TOTALSCORE: 3
BREATHING: 1
BREATHING: 1
FACIALEXPRESSION: 0
BREATHING: 1
NEGVOCALIZATION: 1
BODYLANGUAGE: 1
NEGVOCALIZATION: 1
TOTALSCORE: 3
CONSOLABILITY: 0
FACIALEXPRESSION: 0
FACIALEXPRESSION: 0
BODYLANGUAGE: 1
CONSOLABILITY: 0
BODYLANGUAGE: 1
TOTALSCORE: 3
BREATHING: 1
TOTALSCORE: 3
BODYLANGUAGE: 1
TOTALSCORE: 3
BODYLANGUAGE: 1

## 2019-04-15 ASSESSMENT — PAIN SCALES - GENERAL
PAINLEVEL_OUTOF10: 0
PAINLEVEL_OUTOF10: 3

## 2019-04-15 NOTE — PLAN OF CARE
Writer assessed skin upon arrival. Wounds are documented. Writer will turn pt q2. Will keep skin dry and monitor for further breakdown. Will continue to monitor.

## 2019-04-15 NOTE — H&P
[]Multiple Vehicle Collision  [] unknown collision type    Mechanism considerations  [] Fatality in Same Vehicle      []Ejected       []Rollover          []Extricated    Internal Compartment   []                      []Passenger:      []Front Seat        []Rear [address]  [] Unrestrained   []Lap Belt Only Restrained   [] Shoulder Belt Only Restrained  [] 3 Point Restrained  [] unknown     Air Bags  [] Front Air Bag  []Side Air Bag  []Curtain Airbag [][address] Not Deployed        Pediatric Consideration:      [] Booster Seat  []Infant Car Seat  [] Child Car Seat      [] Motorcycle Collision   Wearing Helmet     []Yes     []No    []Unknown    [] Bicycle Collision Wearing Helmet     []Yes     []No    []Unknown    [] Pedestrian Struck         [] Fall    []From Standing     []From Height  Ft     []Down Stairs ___steps    [] Assault    [] Gunshot  Specify caliber / type of gun: ____________________________    [] Stabbing  Specify weapon type, size: _____________________________    [] Burn  []Flame   []Scald   []Electrical   []Chemical  []Inhalation   []House fire    [x] Other ______Found Down____________________________________________    [] Other protective devices used / worn ___________________________    HISTORY:     Milton Tamayo is a 80 y.o. male that presented to the Emergency Department following being found down in his bathtub. Patient with last known well Friday evening. Patient was found this morning in his home in his bathtub. Per reports, patient with edema and bruising to his right side, blood in bathtub but no obvious signs of trauma per EMS.       Loss of Consciousness []No   []Yes Duration(min)       [x] Unknown     Total Fluids Given Prior To Arrival unknown mL    MEDICATIONS:   []  None     []  Information not available due to exam limitations documented above  Prior to Admission medications    Not on File       ALLERGIES:   []  None    []   Information not available due to exam limitations documented above   Patient has no allergy information on record. Allergy to PCN    PAST MEDICAL HISTORY: []  None   []   Information not available due to exam limitations documented above    has no past medical history on file. has no past surgical history on file. History per other chart includes: AFib, dementia    FAMILY HISTORY   []   Information not available due to exam limitations documented above    family history is not on file. SOCIAL HISTORY  []   Information not available due to exam limitations documented above     has no tobacco history on file. has no alcohol history on file. has no drug history on file. PERTINENT SYSTEMIC REVIEW:    []   Information not available due to exam limitations documented above    Pertinent items are noted in HPI. PHYSICAL EXAMINATION:     GLASCOW COMA SCALE  NEUROMUSCULAR BLOCKADE PRIOR TO ARRIVAL     [x]No        []Yes      Variable  Score   Variable  Score  Eye opening [x]Spontaneous 4 Verbal  [x]Oriented  5     []To voice  3   []Confused  4    []To pain  2   []Inapp words  3    []None  1   []Incomp words 2       []None  1   Motor   [x]Obeys  6    []Localizes pain 5    []Withdraws(pain) 4    []Flexion(pain) 3  []Extension(pain) 2    []None  1     GCS Total = 15    PHYSICAL EXAMINATION    VITAL SIGNS: There were no vitals filed for this visit.     General Appearance: alert and oriented to person, place and time  Skin: Bruising to right side of abdomen  Head: normocephalic and atraumatic  Eyes: pupils 2 equal round and reactive  ENT: tympanic membrane, external ear and ear canal normal bilaterally, oropharynx clear and moist with normal mucous membranes  Neck: c-collar on  Pulmonary/Chest: Clear but diminished to right side  Cardiovascular: Irregular rate and rhythm  Abdomen: soft, bruising noted  Extremities: no cyanosis and no clubbing  Neurologic: alert     FOCUSED ABDOMINAL SONOGRAM FOR TRAUMA (FAST): A good  quality examination was performed by  and representative images were obtained.   [x] No free fluid in the abdomen         RADIOLOGY    PLAIN FILMS  Ordered Findings  [x]CHEST []Normal   [] Preliminary findings: Results Pending See radiology report    CT SCANS  Ordered  [x]HEAD  []Normal   [] Preliminary findings: Results Pending See radiology report  [x]CHEST  []Normal   [] Preliminary findings: Results Pending See radiology report  [x]ABD/PELVIS[]Normal  [] Preliminary findings: Results Pending  See radiology report  [x]C-SPINE  []Normal   [] Preliminary findings: Results Pending See radiology report   [x]T-SPINE  []Normal   [] Preliminary findings: Results Pending See radiology report  [x]L-SPINE  []Normal   [] Preliminary findings: Results Pending See radiology report      LABS    Labs Reviewed - No data to display      CHON Lepe CNP  4/15/19, 12:05 PM

## 2019-04-15 NOTE — ED PROVIDER NOTES
[facility]  ED  eMERGENCY dEPARTMENT eNCOUnter   Attending Attestation     Pt Name: Violette Overton  MRN: [id number]  [place] 1/1/1880  Date of evaluation: 4/15/19       Ap Joaquín Flores is a 80 y.o. male who presents with No chief complaint on file. History: Pt presents after being found in tub with bruising to the right abdomen and flank and AMS. Pt last known well last Friday. Exam: HR elevated, lungs diminished. Abdomen soft, no apparent tenderness but significant bruising over the right abdomen, chest, and flank with dependent edema. Plan for medical workup. Trauma priority called. Plan for admission. I performed a history and physical examination of the patient and discussed management with the resident. I reviewed the residents note and agree with the documented findings and plan of care. Any areas of disagreement are noted on the chart. I was personally present for the key portions of any procedures. I have documented in the chart those procedures where I was not present during the key portions. I have personally reviewed all images and agree with the resident's interpretation. I have reviewed the emergency nurses triage note. I agree with the chief complaint, past medical history, past surgical history, allergies, medications, social and family history as documented unless otherwise noted below. Documentation of the HPI, Physical Exam and Medical Decision Making performed by medical students or scribes is based on my personal performance of the HPI, PE and MDM. For Phys Assistant/ Nurse Practitioner cases/documentation I have had a face to face evaluation of this patient and have completed at least one if not all key elements of the E/M (history, physical exam, and MDM). Additional findings are as noted.     For APC cases I have personally evaluated and examined the patient in conjunction with the APC and agree with the treatment plan and disposition of the patient as recorded by the APC.     Jahaira Villeda MD  Attending Emergency  Physician        Mary Parnell MD  04/15/19 0182

## 2019-04-15 NOTE — ED PROVIDER NOTES
[facility]  Emergency Department Encounter  EmergencyMedicine Resident     Pt Klaus Diggs  MRN: [id number]  [place] 1/1/1880  Date of evaluation: 4/15/19  PCP:  No primary care provider on file. CHIEF COMPLAINT       Fall    HISTORY OF PRESENT ILLNESS  (Location/Symptom, Timing/Onset, Context/Setting, Quality, Duration, Modifying Factors, Severity.)      Tae Carbone is a 80 y.o. male who presents with a fall. Patient was last seen on Friday and doing well. Lives independently. However when his children were visiting him today, they found him in the top with bruising throughout his chest and abdomen. Potentially fell on Friday and has been in there since Friday. Patient is on Coumadin as well as has digoxin on board. Due to her history of CHF. Has a history of a aortic aneurysm as well as a known abdominal aneurysm as well. Patient himself is alert, answering questions, following commands, complaining of right-sided abdominal pain and right-sided chest pain. No fevers or chills. No nausea vomiting.     PAST MEDICAL / SURGICAL / SOCIAL / FAMILY HISTORY     PMH: CHF, AAA    PSH: none    Social History     Socioeconomic History    Marital status: Not on file     Spouse name: Not on file    Number of children: Not on file    Years of education: Not on file    Highest education level: Not on file   Occupational History    Not on file   Social Needs    Financial resource strain: Not on file    Food insecurity:     Worry: Not on file     Inability: Not on file    Transportation needs:     Medical: Not on file     Non-medical: Not on file   Tobacco Use    Smoking status: Not on file   Substance and Sexual Activity    Alcohol use: Not on file    Drug use: Not on file    Sexual activity: Not on file   Lifestyle    Physical activity:     Days per week: Not on file     Minutes per session: Not on file    Stress: Not on file   Relationships    Social connections:     Talks on phone: Not on file     Gets together: Not on file     Attends Quaker service: Not on file     Active member of club or organization: Not on file     Attends meetings of clubs or organizations: Not on file     Relationship status: Not on file    Intimate partner violence:     Fear of current or ex partner: Not on file     Emotionally abused: Not on file     Physically abused: Not on file     Forced sexual activity: Not on file   Other Topics Concern    Not on file   Social History Narrative    Not on file       No family history on file. Allergies:  Patient has no allergy information on record. Home Medications:  Prior to Admission medications    Medication Sig Start Date End Date Taking? Authorizing Provider   aspirin 81 MG EC tablet Take 81 mg by mouth every evening   Yes Historical Provider, MD   digoxin (LANOXIN) 125 MCG tablet Take 125 mcg by mouth daily   Yes Historical Provider, MD   diltiazem (CARDIZEM 12 HR) 120 MG extended release capsule Take 120 mg by mouth daily   Yes Historical Provider, MD   donepezil (ARICEPT) 10 MG tablet Take 10 mg by mouth nightly   Yes Historical Provider, MD   furosemide (LASIX) 40 MG tablet Take 40 mg by mouth 2 times daily   Yes Historical Provider, MD   lovastatin (MEVACOR) 20 MG tablet Take 20 mg by mouth nightly   Yes Historical Provider, MD   Omega-3 Fatty Acids (FISH OIL) 1200 MG CAPS Take 1 capsule by mouth 2 times daily   Yes Historical Provider, MD   metoprolol tartrate (LOPRESSOR) 25 MG tablet Take 12.5 mg by mouth 2 times daily   Yes Historical Provider, MD   midodrine (PROAMATINE) 5 MG tablet Take 5 mg by mouth 3 times daily   Yes Historical Provider, MD   Cholecalciferol (VITAMIN D3) 1000 units CAPS Take 2 capsules by mouth daily   Yes Historical Provider, MD   warfarin (COUMADIN) 5 MG tablet Take 7.5 mg by mouth Take 7.5 mg on tues, thurs and Saturday.  Take 5 mg on mon, wed, fri, sunday   Yes Historical Provider, MD       REVIEW OF SYSTEMS    (2-9 systems for Urine Culture    MRSA DNA Probe, Nasal    CT HEAD WO CONTRAST    CT CERVICAL SPINE WO CONTRAST    CT CHEST ABDOMEN PELVIS W CONTRAST    CT THORACIC SPINE WO CONTRAST    CT LUMBAR SPINE WO CONTRAST    XR CHEST PORTABLE    Troponin    Lactic Acid, Whole Blood    AMMONIA    CK    TROP/MYOGLOBIN    Trauma Panel    Lactic Acid, Whole Blood    TEG, Rapid Citrated    Hemoglobin and hematocrit, blood    SODIUM (POC)    POTASSIUM (POC)    CHLORIDE (POC)    CALCIUM, IONIC (POC)    Hemoglobin and hematocrit, blood    SODIUM (POC)    POTASSIUM (POC)    CHLORIDE (POC)    CALCIUM, IONIC (POC)    Urinalysis with Microscopic    Brain Natriuretic Peptide    CK    Digoxin Level    Myoglobin, Serum    Urine Drug Screen    Lactic Acid, Whole Blood    Diet NPO Effective Now    Vital signs per unit routine    Notify physician    Notify physician    Up with assistance    Place intermittent pneumatic compression device    HYPOGLYCEMIA TREATMENT: blood glucose less than 50 mg/dL and patient  ALERT and TOLERATING PO    HYPOGLYCEMIA TREATMENT: blood glucose less than 70 mg/dL and patient ALERT and TOLERATING PO    HYPOGLYCEMIA TREATMENT: blood glucose less than 70 mg/dL and patient NOT ALERT or NPO    Full Code    Pharmacy to Dose: Vancomycin    Inpatient consult to Critical Care    OT eval and treat    PT eval and treat    Venous Blood Gas, POC    Creatinine W/GFR Point of Care    Lactic Acid, POC    POCT Glucose    Anion Gap (Calc) POC    Venous Blood Gas, POC    Creatinine W/GFR Point of Care    Lactic Acid, POC    POCT Glucose    Anion Gap (Calc) POC    POCT glucose    POCT Glucose    EKG 12 Lead    Type and Screen    Wound ostomy eval and treat    PATIENT STATUS (FROM ED OR OR/PROCEDURAL) Inpatient       MEDICATIONS ORDERED:  Orders Placed This Encounter   Medications    iohexol (OMNIPAQUE 350) solution 130 mL    DISCONTD: dextrose 50 % solution 25 g    piperacillin-tazobactam (ZOSYN) 3.375 g in dextrose 5 % 50 mL IVPB (mini-bag)    vancomycin (VANCOCIN) 1250 mg in dextrose 5 % 250 mL IVPB    vancomycin (VANCOCIN) intermittent dosing (placeholder)    vancomycin (VANCOCIN) 1250 mg in dextrose 5 % 250 mL IVPB    0.9 % sodium chloride bolus    sodium chloride flush 0.9 % injection 10 mL    sodium chloride flush 0.9 % injection 10 mL    ondansetron (ZOFRAN) injection 4 mg    diltiazem 125 mg in dextrose 5 % 125 mL infusion    insulin lispro (HUMALOG) injection vial 0-12 Units    insulin lispro (HUMALOG) injection vial 0-6 Units    glucose (GLUTOSE) 40 % oral gel 15 g    dextrose 50 % solution 12.5 g    glucagon (rDNA) injection 1 mg    dextrose 5 % solution       DDX: Fall, rib fractures versus intracranial hemorrhage versus pulm contusion versus pneumonia versus UTI vs rhabdo    DIAGNOSTIC RESULTS / EMERGENCY DEPARTMENT COURSE / MDM     LABS:  Results for orders placed or performed during the hospital encounter of 04/15/19   Culture Blood #1   Result Value Ref Range    Specimen Description . BLOOD     Special Requests LEFT HAND 3 ML     Culture NO GROWTH 1 HOUR    Culture Blood #1   Result Value Ref Range    Specimen Description . BLOOD     Special Requests RT HAND 6 ML     Culture NO GROWTH 1 HOUR    Troponin   Result Value Ref Range    Troponin, High Sensitivity 28 (H) 0 - 22 ng/L    Troponin T NOT REPORTED <0.03 ng/mL    Troponin Interp NOT REPORTED    Lactic Acid, Whole Blood   Result Value Ref Range    Lactic Acid, Whole Blood 19.0 (H) 0.7 - 2.1 mmol/L   AMMONIA   Result Value Ref Range    Ammonia 13 (L) 16 - 60 umol/L   CK   Result Value Ref Range    Total CK 2,372 (H) 39 - 308 U/L   TROP/MYOGLOBIN   Result Value Ref Range    Troponin, High Sensitivity 100 (HH) 0 - 22 ng/L    Troponin T NOT REPORTED <0.03 ng/mL    Troponin Interp NOT REPORTED     Myoglobin 3,532 (H) 28 - 72 ng/mL   Trauma Panel   Result Value Ref Range    WBC 10.8 3.5 - 11.3 k/uL RBC 4.65 4.21 - 5.77 m/uL    Hemoglobin 14.0 13.0 - 17.0 g/dL    Hematocrit 44.3 40.7 - 50.3 %    MCV 95.3 82.6 - 102.9 fL    MCH 30.1 25.2 - 33.5 pg    MCHC 31.6 28.4 - 34.8 g/dL    RDW 15.9 (H) 11.8 - 14.4 %    Platelets 436 189 - 280 k/uL    MPV 11.4 8.1 - 13.5 fL    NRBC Automated 0.0 0.0 per 100 WBC    Sodium 140 135 - 144 mmol/L    Potassium 4.7 3.7 - 5.3 mmol/L    Chloride 102 98 - 107 mmol/L    CO2 21 20 - 31 mmol/L    Anion Gap 17 9 - 17 mmol/L    Glucose 121 (H) 70 - 99 mg/dL    pH, Johnny 7.300 (L) 7.320 - 7.420    pCO2, Johnny 47.4 39 - 55    pO2, Johnny 21.2 (L) 30 - 50    HCO3, Venous 22.6 (L) 24 - 30 mmol/L    Positive Base Excess, Johnny NOT REPORTED 0.0 - 2.0 mmol/L    Negative Base Excess, Johnny 3.6 (H) 0.0 - 2.0 mmol/L    O2 Sat, Johnny 22.9 (L) 60.0 - 85.0 %    Total Hb NOT REPORTED 12.0 - 16.0 g/dl    Oxyhemoglobin NOT REPORTED 95.0 - 98.0 %    Carboxyhemoglobin 1.1 0 - 5 %    Methemoglobin NOT REPORTED 0.0 - 1.5 %    Pt Temp 37.0     pH, Johnny, Temp Adj NOT REPORTED 7.320 - 7.420    pCO2, Johnny, Temp Adj NOT REPORTED 39 - 55 mmHg    pO2, Johnny, Temp Adj NOT REPORTED 30 - 50 mmHg    O2 Device/Flow/% NOT REPORTED     Respiratory Rate NOT REPORTED     Lux Test NOT REPORTED     Sample Site NOT REPORTED     Pt.  Position NOT REPORTED     Mode NOT REPORTED     Set Rate NOT REPORTED     Total Rate NOT REPORTED     VT NOT REPORTED     FIO2 TRAUMA     Peep/Cpap NOT REPORTED     PSV NOT REPORTED     Text for Respiratory NOT REPORTED     NOTIFICATION NOT REPORTED     NOTIFICATION TIME NOT REPORTED     Blood Bank Specimen BILL FOR SERVICES PERFORMED     hCG Qual CANCEL PER ER NEGATIVE    BUN 55 (H) 8 - 23 mg/dL    CREATININE 1.23 (H) 0.70 - 1.20 mg/dL    GFR Non- NOT REPORTED >60 mL/min    GFR  NOT REPORTED >60 mL/min    GFR Comment      GFR Staging NOT REPORTED     Ethanol <10 <10 mg/dL    Ethanol percent <0.010 <0.010 %    Protime 24.5 (H) 9.0 - 12.0 sec    INR 2.5     PTT 28.3 20.5 - 30.5 sec Lactic Acid, Whole Blood   Result Value Ref Range    Lactic Acid, Whole Blood 4.9 (H) 0.7 - 2.1 mmol/L   TEG, Rapid Citrated   Result Value Ref Range    ACT .0 (H) 86 - 118 sec    Reaction Time Rapid TEG 1.3 (H) 0.0 - 1.0 min    Kinetics Rapid TEG 0.9 (L) 1.0 - 2.0 min    Angle, Rapid TEG 76.6 64 - 80 deg    MA(Max Clot) Rapid TEG 78.4 (H) 52 - 71 mm    LY30(Lysis) TEG 0.0 0 - 8 %    EPL-TEG 0.0 0.0 - 15.0 %    TEG Comment       ACT is the only FDA approved component of the Rapid TEG. TEG testing is not FDA approved for pediatric patients.     Heparin Therapy UNKNOWN    Hemoglobin and hematocrit, blood   Result Value Ref Range    POC Hemoglobin CANNOT BE CALCULATED 13.5 - 17.5 g/dL    POC Hematocrit <10 (L) 41 - 53 %   SODIUM (POC)   Result Value Ref Range    POC Sodium 145 138 - 146 mmol/L   POTASSIUM (POC)   Result Value Ref Range    POC Potassium 2.5 (LL) 3.5 - 4.5 mmol/L   CHLORIDE (POC)   Result Value Ref Range    POC Chloride 112 (H) 98 - 107 mmol/L   CALCIUM, IONIC (POC)   Result Value Ref Range    POC Ionized Calcium 0.86 (L) 1.15 - 1.33 mmol/L   Hemoglobin and hematocrit, blood   Result Value Ref Range    POC Hemoglobin 8.6 (L) 13.5 - 17.5 g/dL    POC Hematocrit 25 (L) 41 - 53 %   SODIUM (POC)   Result Value Ref Range    POC Sodium 139 138 - 146 mmol/L   POTASSIUM (POC)   Result Value Ref Range    POC Potassium 4.2 3.5 - 4.5 mmol/L   CHLORIDE (POC)   Result Value Ref Range    POC Chloride 106 98 - 107 mmol/L   CALCIUM, IONIC (POC)   Result Value Ref Range    POC Ionized Calcium 1.08 (L) 1.15 - 1.33 mmol/L   Urinalysis with Microscopic   Result Value Ref Range    Color, UA YELLOW YELLOW    Turbidity UA CLEAR CLEAR    Glucose, Ur NEGATIVE NEGATIVE    Bilirubin Urine NEGATIVE NEGATIVE    Ketones, Urine TRACE (A) NEGATIVE    Specific Gravity, UA 1.038 (H) 1.005 - 1.030    Urine Hgb MODERATE (A) NEGATIVE    pH, UA 5.0 5.0 - 8.0    Protein, UA 1+ (A) NEGATIVE    Urobilinogen, Urine Normal Normal Nitrite, Urine NEGATIVE NEGATIVE    Leukocyte Esterase, Urine NEGATIVE NEGATIVE    -          WBC, UA 2 TO 5 0 - 5 /HPF    RBC, UA 0 TO 2 0 - 2 /HPF    Casts UA HYALINE 0 - 2 /LPF    Casts UA 50  0 - 2 /LPF    Casts UA FINE GRANULAR 0 - 2 /LPF    Casts UA 2 TO 5 0 - 2 /LPF    Crystals UA NOT REPORTED None /HPF    Epithelial Cells UA 0 TO 2 0 - 5 /HPF    Renal Epithelial, Urine NOT REPORTED 0 /HPF    Bacteria, UA FEW (A) None    Mucus, UA 2+ (A) None    Trichomonas, UA NOT REPORTED None    Amorphous, UA NOT REPORTED None    Other Observations UA NOT REPORTED NOT REQ. Yeast, UA NOT REPORTED None   Brain Natriuretic Peptide   Result Value Ref Range    Pro-BNP 2,413 (H) <300 pg/mL    BNP Interpretation Pro-BNP Reference Range:    CK   Result Value Ref Range    Total  (H) 39 - 308 U/L   Digoxin Level   Result Value Ref Range    Digoxin Lvl <0.3 (L) 0.5 - 2.0 ng/mL    Digoxin Dose Amount NOT REPORTED     Digoxin Date Last Dose NOT REPORTED     Digoxin Dose Time NOT REPORTED    Myoglobin, Serum   Result Value Ref Range    Myoglobin 1,037 (H) 28 - 72 ng/mL   Urine Drug Screen   Result Value Ref Range    Amphetamine Screen, Ur NEGATIVE NEGATIVE    Barbiturate Screen, Ur NEGATIVE NEGATIVE    Benzodiazepine Screen, Urine NEGATIVE NEGATIVE    Cocaine Metabolite, Urine NEGATIVE NEGATIVE    Methadone Screen, Urine NEGATIVE NEGATIVE    Opiates, Urine NEGATIVE NEGATIVE    Phencyclidine, Urine NEGATIVE NEGATIVE    Propoxyphene, Urine NOT REPORTED NEGATIVE    Cannabinoid Scrn, Ur NEGATIVE NEGATIVE    Oxycodone Screen, Ur NEGATIVE NEGATIVE    Methamphetamine, Urine NOT REPORTED NEGATIVE    Tricyclic Antidepressants, Urine NOT REPORTED NEGATIVE    MDMA, Urine NOT REPORTED NEGATIVE    Buprenorphine Urine NOT REPORTED NEGATIVE    Test Information       Assay provides medical screening only.   The absence of expected drug(s) and/or metabolite(s) may indicate diluted or adulterated urine, limitations of testing or timing of collection.    Lactic Acid, Whole Blood   Result Value Ref Range    Lactic Acid, Whole Blood 3.2 (H) 0.7 - 2.1 mmol/L   Venous Blood Gas, POC   Result Value Ref Range    pH, Johnny 7.259 (L) 7.320 - 7.430    pCO2, Johnny 7.9 (L) 41.0 - 51.0 mm Hg    pO2, Johnny 22.3 (L) 30.0 - 50.0 mm Hg    HCO3, Venous 3.6 (L) 22.0 - 29.0 mmol/L    Total CO2, Venous <5 (L) 23.0 - 30.0 mmol/L    Negative Base Excess, Johnny 22 (H) 0.0 - 2.0    Positive Base Excess, Johnny NOT REPORTED 0.0 - 3.0    O2 Sat, Johnny 33 (L) 60.0 - 85.0 %    O2 Device/Flow/% NOT REPORTED     Lux Test NOT REPORTED     Sample Site NOT REPORTED     Mode NOT REPORTED     FIO2 NOT REPORTED     Pt Temp NOT REPORTED     POC pH Temp NOT REPORTED     POC pCO2 Temp NOT REPORTED mm Hg    POC pO2 Temp NOT REPORTED mm Hg   Creatinine W/GFR Point of Care   Result Value Ref Range    POC Creatinine <0.30 (L) 0.51 - 1.19 mg/dL    GFR Comment CANNOT BE CALCULATED >60 mL/min    GFR Non- CANNOT BE CALCULATED >60 mL/min    GFR Comment     Lactic Acid, POC   Result Value Ref Range    POC Lactic Acid 14.72 (H) 0.56 - 1.39 mmol/L   POCT Glucose   Result Value Ref Range    POC Glucose <20 (LL) 74 - 100 mg/dL   Anion Gap (Calc) POC   Result Value Ref Range    Anion Gap 29 (H) 7 - 16 mmol/L   Venous Blood Gas, POC   Result Value Ref Range    pH, Johnny 7.296 (L) 7.320 - 7.430    pCO2, Johnny 28.4 (L) 41.0 - 51.0 mm Hg    pO2, Johnny 30.0 30.0 - 50.0 mm Hg    HCO3, Venous 13.9 (L) 22.0 - 29.0 mmol/L    Total CO2, Venous 15 (L) 23.0 - 30.0 mmol/L    Negative Base Excess, Johnny 11 (H) 0.0 - 2.0    Positive Base Excess, Johnny NOT REPORTED 0.0 - 3.0    O2 Sat, Johnny 52 (L) 60.0 - 85.0 %    O2 Device/Flow/% NOT REPORTED     Lux Test NOT REPORTED     Sample Site NOT REPORTED     Mode NOT REPORTED     FIO2 NOT REPORTED     Pt Temp NOT REPORTED     POC pH Temp NOT REPORTED     POC pCO2 Temp NOT REPORTED mm Hg    POC pO2 Temp NOT REPORTED mm Hg   Creatinine W/GFR Point of Care   Result Value Ref Range POC Creatinine 0.60 0.51 - 1.19 mg/dL    GFR Comment CANNOT BE CALCULATED >60 mL/min    GFR Non- CANNOT BE CALCULATED >60 mL/min    GFR Comment     Lactic Acid, POC   Result Value Ref Range    POC Lactic Acid 11.07 (H) 0.56 - 1.39 mmol/L   POCT Glucose   Result Value Ref Range    POC Glucose 73 (L) 74 - 100 mg/dL   Anion Gap (Calc) POC   Result Value Ref Range    Anion Gap 19 (H) 7 - 16 mmol/L   TYPE AND SCREEN   Result Value Ref Range    Expiration Date 04/18/2019     Arm Band Number [id number]     ABO/Rh A POSITIVE     Antibody Screen NEGATIVE        IMPRESSION: 60-year-old male comes into the emergency department secondary to possible fall, bruising over the right side of the chest and abdomen, possibly laying in the top for 2 days. ,  Priority was called, trauma team did a CT head, chest abdomen or pelvis. Multiple rib fractures. Chest x-ray was concerning for contusion versus pneumonia, patient was started on vancomycin and Zosyn. Lab work was positive for abdominal, lactic acidosis, possibly secondary to dehydration versus infection. The shin with a history of CHF, however was given significant fluids by the trauma team.    RADIOLOGY:    Ct Head Wo Contrast    Result Date: 4/15/2019  EXAMINATION: CT OF THE HEAD WITHOUT CONTRAST  4/15/2019 12:16 pm TECHNIQUE: CT of the head was performed without the administration of intravenous contrast. Dose modulation, iterative reconstruction, and/or weight based adjustment of the mA/kV was utilized to reduce the radiation dose to as low as reasonably achievable. COMPARISON: None. HISTORY: ORDERING SYSTEM PROVIDED HISTORY: trauma TECHNOLOGIST PROVIDED HISTORY: Ordering Physician Provided Reason for Exam: Pt presents after being found in tub with bruising to the right abdomen and flank and AMS. Pt last known well last Friday. FINDINGS: BRAIN/VENTRICLES: No acute blood products, shift of the midline structures, or CT evidence of acute infarct.   The ventricles and sulci are enlarged. Scattered foci of low attenuation involving the periventricular white matter compatible with microvascular ischemic changes. Intracranial atherosclerotic changes of the anterior and posterior circulations. ORBITS: The visualized portion of the orbits demonstrate no acute abnormality. SINUSES: Minimal fluid within the right maxillary sinus may be reactive but also implies acute sinusitis versus occult maxillary bone fracture. Remaining paranasal sinuses are otherwise clear. SOFT TISSUES/SKULL:  No acute osseous abnormality. No acute intracranial abnormality. Chronic microvascular ischemic change, cortical volume loss, and ventricular enlargement likely related to age related changes. Minimal fluid in the right maxillary sinus may be reactive, but could also reflect sequela of acute sinusitis or occult fracture. Ct Cervical Spine Wo Contrast    Result Date: 4/15/2019  EXAMINATION: CT OF THE CERVICAL SPINE WITHOUT CONTRAST 4/15/2019 12:16 pm TECHNIQUE: CT of the cervical spine was performed without the administration of intravenous contrast. Multiplanar reformatted images are provided for review. Dose modulation, iterative reconstruction, and/or weight based adjustment of the mA/kV was utilized to reduce the radiation dose to as low as reasonably achievable. COMPARISON: None. HISTORY: ORDERING SYSTEM PROVIDED HISTORY: trauma Acute injury, initial evaluation FINDINGS: BONES/ALIGNMENT: There is no evidence of an acute cervical spine fracture. Minimal grade 1 retrolisthesis of the C4 vertebral body measuring 3 mm is likely degenerative in nature. There is otherwise normal alignment of the cervical spine. DEGENERATIVE CHANGES: There is severe multilevel degenerative disc disease and moderate multilevel facet arthropathy throughout the cervical spine. The disc space narrowing is most pronounced at C4-5, C5-6 and C6-7. There is also moderate degenerative arthrosis at C1-2.  SOFT TISSUES: There is no prevertebral soft tissue swelling. Atherosclerotic carotid arterial calcifications noted. No acute abnormality of the cervical spine. Moderate to severe multilevel degenerative disc disease and facet arthropathy throughout the cervical spine. If there are neurologic symptoms, MRI could be performed for further evaluation. Ct Thoracic Spine Wo Contrast    Result Date: 4/15/2019  EXAMINATION: CT OF THE CHEST, ABDOMEN, AND PELVIS WITH CONTRAST; CT OF THE THORACIC SPINE WITHOUT CONTRAST; CT OF THE LUMBAR SPINE WITHOUT CONTRAST 4/15/2019 12:16 pm TECHNIQUE: CT of the chest, abdomen and pelvis was performed with the administration of intravenous contrast. Multiplanar reformatted images are provided for review. Dose modulation, iterative reconstruction, and/or weight based adjustment of the mA/kV was utilized to reduce the radiation dose to as low as reasonably achievable.; CT of the thoracic spine was performed without the administration of intravenous contrast. Multiplanar reformatted images are provided for review. Dose modulation, iterative reconstruction, and/or weight based adjustment of the mA/kV was utilized to reduce the radiation dose to as low as reasonably achievable.; CT of the lumbar spine was performed without the administration of intravenous contrast. Multiplanar reformatted images are provided for review. Dose modulation, iterative reconstruction, and/or weight based adjustment of the mA/kV was utilized to reduce the radiation dose to as low as reasonably achievable. COMPARISON: None HISTORY: ORDERING SYSTEM PROVIDED HISTORY: trauma TECHNOLOGIST PROVIDED HISTORY: Ordering Physician Provided Reason for Exam: found down Acuity: Unknown Type of Exam: Unknown FINDINGS: Chest: Mediastinum: Visualized portions of the thyroid gland are unremarkable. Heart is enlarged without pericardial fluid collection. No evidence for mediastinal hematoma.   Aneurysmal dilatation of the ascending Oct;50(4 Suppl):S2-49     Ct Lumbar Spine Wo Contrast    Result Date: 4/15/2019  EXAMINATION: CT OF THE CHEST, ABDOMEN, AND PELVIS WITH CONTRAST; CT OF THE THORACIC SPINE WITHOUT CONTRAST; CT OF THE LUMBAR SPINE WITHOUT CONTRAST 4/15/2019 12:16 pm TECHNIQUE: CT of the chest, abdomen and pelvis was performed with the administration of intravenous contrast. Multiplanar reformatted images are provided for review. Dose modulation, iterative reconstruction, and/or weight based adjustment of the mA/kV was utilized to reduce the radiation dose to as low as reasonably achievable.; CT of the thoracic spine was performed without the administration of intravenous contrast. Multiplanar reformatted images are provided for review. Dose modulation, iterative reconstruction, and/or weight based adjustment of the mA/kV was utilized to reduce the radiation dose to as low as reasonably achievable.; CT of the lumbar spine was performed without the administration of intravenous contrast. Multiplanar reformatted images are provided for review. Dose modulation, iterative reconstruction, and/or weight based adjustment of the mA/kV was utilized to reduce the radiation dose to as low as reasonably achievable. COMPARISON: None HISTORY: ORDERING SYSTEM PROVIDED HISTORY: trauma TECHNOLOGIST PROVIDED HISTORY: Ordering Physician Provided Reason for Exam: found down Acuity: Unknown Type of Exam: Unknown FINDINGS: Chest: Mediastinum: Visualized portions of the thyroid gland are unremarkable. Heart is enlarged without pericardial fluid collection. No evidence for mediastinal hematoma. Aneurysmal dilatation of the ascending aorta measuring 5 x 4.7 cm. Lungs/pleura: Small right pleural effusion. No evidence for pneumothorax. Multiple small calcified nodules are identified bilaterally. No discrete pulmonary contusion. Central airways are patent and clear. Soft Tissues/Bones: Fracture of the right 6th through 9th ribs in multiple locations. .  Right 5th rib fracture anteriorly. Subcutaneous soft tissue edema along the right anterior chest wall and anterior abdomen. Abdomen/Pelvis: Organs: Evaluation of the liver is limited due to extensive motion artifact. No traumatic injury identified. Gallbladder, pancreas, and spleen are without traumatic injury. GI/Bowel: Moderate amount fecal material within the rectosigmoid. Small bowel loops are normal in caliber. No evidence for bowel contusion. No free intraperitoneal air or fluid. Pelvis:  No evidence for free fluid. Peritoneum/Retroperitoneum: The adrenal glands are normal in size and configuration bilaterally. Kidneys perfuse and excrete in a symmetric fashion and ureters are not obstructed. Urinary bladder is unremarkable. Bones/Soft Tissues: Osseous structures are intact without evidence for acute fracture or dislocation. No definite lytic or blastic lesions. Overlying soft tissues are unremarkable. Vasculature: Borderline dilated abdominal aorta measuring 3.3 x 2.7 cm. Iliac arteries are dilated bilaterally. Thoracolumbar spine: No fracture or malalignment of the thoracolumbar spine. Fractures of the right 6th and 9th ribs in multiple locations, laterally and anterolaterally. Mildly displaced right 5th rib fracture. Hyperdense right pleural effusion, possibly hemothorax. No evidence for fracture or malalignment of the thoracolumbar spine. Cardiomegaly. Mildly dilated ascending aorta measuring 5.0 x 4.7 cm. Borderline dilated infrarenal abdominal aorta measuring 3.3 x 2.7 cm. Managing Abdominal Aortic Aneurysms 3.0-3.4 cm: Every 3 years. Reference: J Vasc Surg. 2009 Oct;50(4 Suppl):S2-49     Xr Chest Portable    Result Date: 4/15/2019  EXAMINATION: SINGLE XRAY VIEW OF THE CHEST 4/15/2019 12:25 pm COMPARISON: None. HISTORY: ORDERING SYSTEM PROVIDED HISTORY: trauma TECHNOLOGIST PROVIDED HISTORY: trauma FINDINGS: Median sternotomy. Prosthetic heart valve.   Right-sided pleural effusion and basilar airspace disease. Asymmetric hazy opacities right chest compared with the left possibly edema versus contusion. The left pleural stripe is prominent but this is felt to represent sequela of prominent subpleural fat. A very small pneumothorax cannot entirely be excluded. .  Presumed nasal cannula tubing overlying the chest.  Cardiac leads overlie the chest.     Asymmetric right mid and lower lung opacity compared with the left with associated effusion possibly edema. In the setting of trauma, differential includes contusion. Prominent left pleural stripe felt represent sequela of prominent subpleural fat. A tiny pneumothorax is not entirely excluded. This can be confirmed with the in progress CT chest.     Ct Chest Abdomen Pelvis W Contrast    Result Date: 4/15/2019  EXAMINATION: CT OF THE CHEST, ABDOMEN, AND PELVIS WITH CONTRAST; CT OF THE THORACIC SPINE WITHOUT CONTRAST; CT OF THE LUMBAR SPINE WITHOUT CONTRAST 4/15/2019 12:16 pm TECHNIQUE: CT of the chest, abdomen and pelvis was performed with the administration of intravenous contrast. Multiplanar reformatted images are provided for review. Dose modulation, iterative reconstruction, and/or weight based adjustment of the mA/kV was utilized to reduce the radiation dose to as low as reasonably achievable.; CT of the thoracic spine was performed without the administration of intravenous contrast. Multiplanar reformatted images are provided for review. Dose modulation, iterative reconstruction, and/or weight based adjustment of the mA/kV was utilized to reduce the radiation dose to as low as reasonably achievable.; CT of the lumbar spine was performed without the administration of intravenous contrast. Multiplanar reformatted images are provided for review. Dose modulation, iterative reconstruction, and/or weight based adjustment of the mA/kV was utilized to reduce the radiation dose to as low as reasonably achievable.  COMPARISON: None HISTORY: [address] PROVIDED HISTORY: trauma TECHNOLOGIST PROVIDED HISTORY: Ordering Physician Provided Reason for Exam: found down Acuity: Unknown Type of Exam: Unknown FINDINGS: Chest: Mediastinum: Visualized portions of the thyroid gland are unremarkable. Heart is enlarged without pericardial fluid collection. No evidence for mediastinal hematoma. Aneurysmal dilatation of the ascending aorta measuring 5 x 4.7 cm. Lungs/pleura: Small right pleural effusion. No evidence for pneumothorax. Multiple small calcified nodules are identified bilaterally. No discrete pulmonary contusion. Central airways are patent and clear. Soft Tissues/Bones: Fracture of the right 6th through 9th ribs in multiple locations. .  Right 5th rib fracture anteriorly. Subcutaneous soft tissue edema along the right anterior chest wall and anterior abdomen. Abdomen/Pelvis: Organs: Evaluation of the liver is limited due to extensive motion artifact. No traumatic injury identified. Gallbladder, pancreas, and spleen are without traumatic injury. GI/Bowel: Moderate amount fecal material within the rectosigmoid. Small bowel loops are normal in caliber. No evidence for bowel contusion. No free intraperitoneal air or fluid. Pelvis:  No evidence for free fluid. Peritoneum/Retroperitoneum: The adrenal glands are normal in size and configuration bilaterally. Kidneys perfuse and excrete in a symmetric fashion and ureters are not obstructed. Urinary bladder is unremarkable. Bones/Soft Tissues: Osseous structures are intact without evidence for acute fracture or dislocation. No definite lytic or blastic lesions. Overlying soft tissues are unremarkable. Vasculature: Borderline dilated abdominal aorta measuring 3.3 x 2.7 cm. Iliac arteries are dilated bilaterally. Thoracolumbar spine: No fracture or malalignment of the thoracolumbar spine. Fractures of the right 6th and 9th ribs in multiple locations, laterally and anterolaterally.   Mildly displaced right 5th rib fracture. Hyperdense right pleural effusion, possibly hemothorax. No evidence for fracture or malalignment of the thoracolumbar spine. Cardiomegaly. Mildly dilated ascending aorta measuring 5.0 x 4.7 cm. Borderline dilated infrarenal abdominal aorta measuring 3.3 x 2.7 cm. Managing Abdominal Aortic Aneurysms 3.0-3.4 cm: Every 3 years. Reference: J Vasc Surg. 2009 Oct;50(4 Suppl):S2-49       EKG    EKG Interpretation    Interpreted by me    Rhythm: A fib  Rate: 113  Axis: normal  Ectopy: none  Conduction: normal  ST Segments: no acute change  T Waves: no acute change  Q Waves: none    Clinical Impression: A fib RVR    All EKG's are interpreted by the Emergency Department Physician who either signs or Co-signs this chart in the absence of a cardiologist.    EMERGENCY DEPARTMENT COURSE:    Patient continued to have A. fib with RVR, was started on Cardizem infusion   For rate control. Patient digoxin level was negative. No acute intracranial abnormality. Medical admission recommended by the trauma surgery service. Discussed with Dr. Melchor Walsh with the critical care service, patient will be admitted to the service of Dr. Monserrat Salmon. CT imaging also shows thoracic and abdominal aneurysm, known history of aneurysms, no leakage on CT imaging    PROCEDURES:  None    CONSULTS:  PHARMACY TO DOSE VANCOMYCIN  IP CONSULT TO CRITICAL CARE    CRITICAL CARE:  None    FINAL IMPRESSION      1. Traumatic rhabdomyolysis, initial encounter ([facility] [address]    2. Closed fracture of multiple ribs of right side, initial encounter    3. Dehydration    4. Lactic acidosis    5. Abdominal aortic aneurysm (AAA) without rupture ([address]    6. Contusion of right lung, initial encounter          DISPOSITION / [address] Admitted 04/15/2019 02:05:48 PM      PATIENT REFERRED TO:  No follow-up provider specified.     DISCHARGE MEDICATIONS:  Current Discharge Medication List          Suni Olvera MD  Emergency Medicine Resident    (Please note that portions of thisnote were completed with a voice recognition program.  Efforts were made to edit the dictations but occasionally words are mis-transcribed.)       Rebecca Calderón MD  04/15/19 [id number]

## 2019-04-15 NOTE — CARE COORDINATION
Case Management Initial Discharge Plan  Ez Ponce,             Met with:family member daughter to discuss discharge plans. Information verified: address, contacts, phone number, , insurance Yes  PCP: No primary care provider on file. Date of last visit: Dr Maria C Lei Provider: medicare/humana    Discharge Planning    Living Arrangements:  Alone   Support Systems:  Family Members    Home has  stories   stairs to climb to get into front door, stairs to climb to reach second floor  Location of bedroom/bathroom in home     Patient able to perform ADL's:Independent    Current Services (outpatient & in home) none  DME equipment: cane  DME provider:     Pharmacy: [facility] with pill packs or [facility] on [address] Purchasing Medications:  No  Does patient want to participate in local refill/ meds to beds program?  No    Potential Assistance Needed:  [address]    Patient agreeable to home care: Yes  Freedom of choice provided:  yes    Prior SNF/Rehab Placement and Facility:   Agreeable to SNF/Rehab: ? [place] of choice provided: yes   Evaluation: yes    Expected Discharge date:  19  Patient [facility] to be [facility] to:  [facility]  Follow Up Appointment: Best Day/ Time:      Transportation provider: family  Transportation arrangements needed for discharge: No    Readmission Risk              Risk of Unplanned Readmission:        12             Does patient have a readmission risk score greater than 14?: No  If yes, follow-up appointment must be made within 7 days of discharge. Discharge Plan: Pt with baseline dementia. Spoke with pt's daughters Chin Patel and Josh Ditez. Pt lives alone. Uses a cane. They will bring insurance cards if they are not already in [facility] [address] Pt's  10-10-[birth year]. #   Unknown dc needs. Likely SNF.           Electronically signed by Heather Rasheed RN on 4/15/19 at 4:25 PM

## 2019-04-15 NOTE — FLOWSHEET NOTE
[address] [facility] [address]     Emergency/Trauma Note    PATIENT NAME: Cheryle Wagner    Shift date: 4/15/19  Shift day: Monday   Shift # 1    Room # TRAUMA B/TRAUMAB   Name: Cheryle Wagner           Age: 80 y.o. Gender: male          Restoration: [address] [place] [address] [facility]: None  Trauma/Incident type: Adult Trauma Priority  Admit Date & Time: 4/15/2019 11:59 AM  TRAUMA NAME: Jacquelyn Mcmahon    PATIENT/EVENT DESCRIPTION:  Cheryle Wagner is a 80year old male who arrived via ground ambulance from his home after being found down in his bathtub. Per report the patient is believed to have been down for 3 days before a member of his family found him. He will be admitted to the hospital for observation. Per family the patient has some level of dementia. Pt to be admitted to TRAUMA B/TRAUMAB. SPIRITUAL ASSESSMENT/INTERVENTION:     04/15/19 1300   Encounter Summary   Services provided to: Patient and family together   Place of Gnosticism None   Contact Sikhism No   Continue Visiting   (4/15/19)   Complexity of Encounter Moderate   Length of Encounter 30 minutes   Spiritual Assessment Completed Yes   Crisis   Type Trauma  (TRAUMA PRIORITY)   Assessment Approachable;Passive;Coping   Intervention Nurtured hope;Prayer;Discussed belief system/Jain practices/lucille   Outcome Comfort;Expressed gratitude;Coping;Tearful;Receptive     I attempted to speak to the patient but he was not fully aware of his surroundings and his eyes remained closed. He attempted to speak but his voice was a whisper and I was not able to understand what he was saying. I met his family when they arrived at the hospital and escorted them to the patient's bedside. I provided spiritual and emotional support for the patient's son, Nesha Mcfarlane, and his daughters, Sunil Boone and Juliet Soto. Another son has not yet arrived at the hospital. I listened as the family expressed their worries and concerns.  I nurtured hope, assuring them of the compassionate care of the hospital staff. Per family the patient has no Rastafarian affiliation but when I asked him if a prayer would be helpful he said, \"yes\". I prayed and wished the patient and family well. They thanked me for my care and concern. PATIENT BELONGINGS:  No belongings noted    ANY BELONGINGS OF SIGNIFICANT VALUE NOTED:  No.    REGISTRATION STAFF NOTIFIED? Yes    WHAT IS YOUR SPIRITUAL CARE PLAN FOR THIS PATIENT?:  Chaplains are available for further spiritual and emotional support as requested by the patient or family.        Electronically signed by Omaira Quintanilla on 4/15/2019 at 1:18 PM.  [facility]  [phone]

## 2019-04-15 NOTE — PLAN OF CARE
[facility] [M62.82] . Vitals:    04/15/19 1638   BP:    Pulse:    Resp: 21   Temp:    SpO2: 100%   . Patients home meds are   Prior to Admission medications    Medication Sig Start Date End Date Taking? Authorizing Provider   aspirin 81 MG EC tablet Take 81 mg by mouth every evening   Yes Historical Provider, MD   digoxin (LANOXIN) 125 MCG tablet Take 125 mcg by mouth daily   Yes Historical Provider, MD   diltiazem (CARDIZEM 12 HR) 120 MG extended release capsule Take 120 mg by mouth daily   Yes Historical Provider, MD   donepezil (ARICEPT) 10 MG tablet Take 10 mg by mouth nightly   Yes Historical Provider, MD   furosemide (LASIX) 40 MG tablet Take 40 mg by mouth 2 times daily   Yes Historical Provider, MD   lovastatin (MEVACOR) 20 MG tablet Take 20 mg by mouth nightly   Yes Historical Provider, MD   Omega-3 Fatty Acids (FISH OIL) 1200 MG CAPS Take 1 capsule by mouth 2 times daily   Yes Historical Provider, MD   metoprolol tartrate (LOPRESSOR) 25 MG tablet Take 12.5 mg by mouth 2 times daily   Yes Historical Provider, MD   midodrine (PROAMATINE) 5 MG tablet Take 5 mg by mouth 3 times daily   Yes Historical Provider, MD   Cholecalciferol (VITAMIN D3) 1000 units CAPS Take 2 capsules by mouth daily   Yes Historical Provider, MD   warfarin (COUMADIN) 5 MG tablet Take 7.5 mg by mouth Take 7.5 mg on tues, thurs and Saturday.  Take 5 mg on mon, wed, fri, sunday   Yes Historical Provider, MD   .  Recent Surgical History: None = 0     Assessment     Peak Flow (asthma only)    Predicted:     Personal Best:     PEF     % Predicted     Peak Flow : not applicable = 0    CFH3/CGQ    FEV1 Predicted         FEV1       FEV1 % Predicted     FVC     IS volume     IBW       RR   Breath Sounds:decreased      · Bronchodilator assessment at level    · Hyperinflation assessment at level   · Secretion Management assessment at level    ·   · []    Bronchodilator Lois Uribe [facility]  6:30 PM                            FEMALE                                  MALE                            FEV1 Predicted Normal Values                        FEV1 Predicted Normal Values          Age                                     Height in Feet and Inches       Age                                     Height in Feet and Inches       4' 11\" 5' 1\" 5' 3\" 5' 5\" 5' 7\" 5' 9\" 5' 11\" 6' 1\"  4' 11\" 5' 1\" 5' 3\" 5' 5\" 5' 7\" 5' 9\" 5' 11\" 6' 1\"   43 - 45 2.49 2.66 2.84 3.03 3.22 3.42 3.62 3.83 42 - 45 2.82 3.03 3.26 3.49 3.72 3.96 4.22 4.47   46 - 49 2.40 2.57 2.76 2.94 3.14 3.33 3.54 3.75 46 - 49 2.70 2.92 3.14 3.37 3.61 3.85 4.10 4.36   50 - 53 2.31 2.48 2.66 2.85 3.04 3.24 3.45 3.66 50 - 53 2.58 2.80 3.02 3.25 3.49 3.73 3.98 4.24   54 - 57 2.21 2.38 2.57 2.75 2.95 3.14 3.35 3.56 54 - 57 2.46 2.67 2.89 3.12 3.36 3.60 3.85 4.11   58 - 61 2.10 2.28 2.46 2.65 2.84 3.04 3.24 3.45 58 - 61 2.32 2.54 2.76 2.99 3.23 3.47 3.72 3.98   62 - 65 1.99 2.17 2.35 2.54 2.73 2.93 3.13 3.34 62 - 65 2.19 2.40 2.62 2.85 3.09 3.33 3.58 3.84   66 - 69 1.88 2.05 2.23 2.42 2.61 2.81 3.02 3.23 66 - 69 2.04 2.26 2.48 2.71 2.95 3.19 3.44 3.70   70+ 1.82 1.99 2.17 2.36 2.55 2.75 2.95 3.16 70+ 1.97 2.19 2.41 2.64 2.87 3.12 3.37 3.62             Predicted Peak Expiratory Flow Rate                                       Height (in)  Female       Height (in) Male           Age 79 16 41 06 15 08 78 74 Age            21 344 357 372 387 402 417 432 446  60 62 64 66 68 70 72 74 76   25 337 352 366 381 396 411 426 441 25 447 476 505 533 562 591 619 648 677   30 329 344 359 374 389 404 419 434 30 437 466 494 523 552 580 609 638 667   35 322 337 351 366 381 396 411 426 35 426 455 484 512 541 570 598 627 657   40 314 329 344 359 374 389 404 419 40 416 445 473 502 531 559 588 617 647   45 307 322 336 351 366 381 396 411 45 405 434 463 491 520 549 577 606 636   50 299 314 329 344 359 374 389 404 50 395 424 452 481 510 538 567 596 625   55 292 742 012 727 727 834 767 479 35 479 522 785 346 593 078 734 561 545   42 972 084 300 687 480 012 243 929 84 790 864 617 916 669 428 406 218 488   96 455 733 857 589 244 119 454 031 79 221 609 485 611 366 808 728 636 594   70 269 284 299 314 329 344 359 374 70 353 382 410 439 468 496 525 554 583   75 261 274 289 305 319 334 348 364 75 344 372 400 429 458 487 515 544 573   80 253 266 282 296 312 327 342 356 80 335 362 390 419 448 [phone] 562

## 2019-04-15 NOTE — PROGRESS NOTES
PHARMACY NOTE  Jenniffer James was ordered fish oil. Per the [address], this medication is non-formulary and not stocked by pharmacy. The medication can be reordered at discharge.    Micheline Son PharmD, BCPS 4/15/2019 5:32 PM

## 2019-04-15 NOTE — H&P
Critical Care - History and Physical Examination    Patient's name:  Swathi Maldonado  Medical Record Number: [id number]  Patient's account/billing number: [de-identified]  Patient's YOB: 1880  Age: 80 y.o. Date of Admission: 4/15/2019 11:59 AM  Reason of ICU admission:   Date of History and Physical Examination: 4/15/2019      Primary Care Physician: No primary care provider on file. Attending Physician:    Code Status: Full Code    Chief complaint: Fall    Reason for ICU admission:  Airway Monitoring      History Of Present Illness:   History was obtained from child (Patient's adult son)      Swathi Maldonado is a 80 y.o. presenting s/p fall. Patient's son relays history that his sister checked on patient on Friday and was doing well. Patient's other son was supposed to check on over the weekend, but the patient did not answer the phone, so the son never went to go check on him. The patient's other son who is in the emergency department with him today, went to his home this morning to check on him and found him in the bathtub. Patient has not had any medications since Friday. Patient does take Coumadin and digoxin. Since done states that he has a \"leaky valve\" that was repaired, and then a second one that had an unsuccessful valve replacement. He also has a history of A. fib, as well as a thoracic and abdominal aortic aneurysm, neither of which have ruptured. Significant bruising was noted to the right side of the abdomen, and patient noted to be altered. At baseline, patient is ambulatory with a cane, independent for activities of daily living. He does have a history of dementia, over the past 2 years. On arrival, a trauma priority was paged out. Patient was noted to have right-sided rib fractures. Patient also noted to have elevated CK and myoglobin, as well as continued altered mental status.   CT head negative, CT chest with multiple right sided rib fractures, small hemopneumothorax, and no other injuries noted. Lab work revealed elevated CK, myoglobin, lactic acid, mildly elevated Creatinine. Patient has a significant cardiac history and follows with Dr. Jesse Seymour. Past Medical History:  No past medical history on file. Past Surgical History:  No past surgical history on file. Allergies: Allergies not on file      Home Medications:   Prior to Admission medications    Medication Sig Start Date End Date Taking? Authorizing Provider   aspirin 81 MG EC tablet Take 81 mg by mouth every evening   Yes Historical Provider, MD   digoxin (LANOXIN) 125 MCG tablet Take 125 mcg by mouth daily   Yes Historical Provider, MD   diltiazem (CARDIZEM 12 HR) 120 MG extended release capsule Take 120 mg by mouth daily   Yes Historical Provider, MD   donepezil (ARICEPT) 10 MG tablet Take 10 mg by mouth nightly   Yes Historical Provider, MD   furosemide (LASIX) 40 MG tablet Take 40 mg by mouth 2 times daily   Yes Historical Provider, MD   lovastatin (MEVACOR) 20 MG tablet Take 20 mg by mouth nightly   Yes Historical Provider, MD   Omega-3 Fatty Acids (FISH OIL) 1200 MG CAPS Take 1 capsule by mouth 2 times daily   Yes Historical Provider, MD   metoprolol tartrate (LOPRESSOR) 25 MG tablet Take 12.5 mg by mouth 2 times daily   Yes Historical Provider, MD   midodrine (PROAMATINE) 5 MG tablet Take 5 mg by mouth 3 times daily   Yes Historical Provider, MD   Cholecalciferol (VITAMIN D3) 1000 units CAPS Take 2 capsules by mouth daily   Yes Historical Provider, MD   warfarin (COUMADIN) 5 MG tablet Take 7.5 mg by mouth Take 7.5 mg on tues, thurs and Saturday. Take 5 mg on mon, wed, fri, sunday   Yes Historical Provider, MD       Social History:   TOBACCO:   has no tobacco history on file. ETOH:   has no alcohol history on file. DRUGS:  has no drug history on file. OCCUPATION:      Family History:   No family history on file.         REVIEW OF SYSTEMS (ROS):  Review of Systems - Negative except mentioned in HPI   General ROS: negative  Psychological ROS: negative  Ophthalmic ROS: negative  ENT: negative  Hematological and Lymphatic ROS: negative  Endocrine ROS: negative  Breast ROS: negative  Respiratory ROS: no cough, shortness of breath, or wheezing  Cardiovascular ROS: no chest pain or dyspnea on exertion  Gastrointestinal ROS:negative  Genito-Urinary ROS: negative  Musculoskeletal ROS: negative  Neurological ROS: negative  Dermatological ROS: negative      Physical Exam:    Vitals: BP (!) 134/94   Pulse 102   Temp 98.6 °F (37 °C) (Axillary)   Resp 12   Ht 5' 7\" (1.702 m)   Wt 168 lb 10.4 oz (76.5 kg)   BMI 26.41 kg/m²     Body weight:   Wt Readings from Last 3 Encounters:   04/15/19 168 lb 10.4 oz (76.5 kg)       Body Mass Index : Body mass index is 26.41 kg/m². PHYSICAL EXAMINATION :  Constitutional: nontoxic appearing, lying flat on his back, sleepy but easily arousable, answers most questions appropriately  EENT: PERRLA, EOMI, sclera anicteric, oropharynx clear  Neck: C collar in place, no midline tenderness   Respiratory: diminished bilaterally, no crackles noted   Cardiovascular: regular rate and rhythm, normal S1, S2, no murmur noted and 2+ pulses throughout  Abdomen: soft, nontender, nondistended, no masses or organomegaly  Neurological:  Extremities:  peripheral pulses normal, moves all 4 extremities to commands, reduced ROM BLE 2/2 effort  Skin: extensive ecchymosis to right shoulder, right torso, right flank      Laboratory findings:-    CBC:   Recent Labs     04/15/19  1221   WBC 10.8   HGB 14.0        BMP:    Recent Labs     04/15/19  1219 04/15/19  1221 04/15/19  1225   NA  --  140  --    K  --  4.7  --    CL  --  102  --    CO2  --  21  --    BUN  --  55*  --    CREATININE <0.30* 1.23* 0.60   GLUCOSE  --  121*  --      S. Calcium:No results for input(s): CALCIUM in the last 72 hours. S. Ionized Calcium:No results for input(s): IONCA in the last 72 hours.   S. Magnesium:No results ORDERING SYSTEM PROVIDED HISTORY: trauma TECHNOLOGIST PROVIDED HISTORY: Ordering Physician Provided Reason for Exam: Pt presents after being found in tub with bruising to the right abdomen and flank and AMS. Pt last known well last Friday. FINDINGS: BRAIN/VENTRICLES: No acute blood products, shift of the midline structures, or CT evidence of acute infarct. The ventricles and sulci are enlarged. Scattered foci of low attenuation involving the periventricular white matter compatible with microvascular ischemic changes. Intracranial atherosclerotic changes of the anterior and posterior circulations. ORBITS: The visualized portion of the orbits demonstrate no acute abnormality. SINUSES: Minimal fluid within the right maxillary sinus may be reactive but also implies acute sinusitis versus occult maxillary bone fracture. Remaining paranasal sinuses are otherwise clear. SOFT TISSUES/SKULL:  No acute osseous abnormality. No acute intracranial abnormality. Chronic microvascular ischemic change, cortical volume loss, and ventricular enlargement likely related to age related changes. Minimal fluid in the right maxillary sinus may be reactive, but could also reflect sequela of acute sinusitis or occult fracture. Ct Cervical Spine Wo Contrast    Result Date: 4/15/2019  EXAMINATION: CT OF THE CERVICAL SPINE WITHOUT CONTRAST 4/15/2019 12:16 pm TECHNIQUE: CT of the cervical spine was performed without the administration of intravenous contrast. Multiplanar reformatted images are provided for review. Dose modulation, iterative reconstruction, and/or weight based adjustment of the mA/kV was utilized to reduce the radiation dose to as low as reasonably achievable. COMPARISON: None. HISTORY: ORDERING SYSTEM PROVIDED HISTORY: trauma Acute injury, initial evaluation FINDINGS: BONES/ALIGNMENT: There is no evidence of an acute cervical spine fracture.  Minimal grade 1 retrolisthesis of the C4 vertebral body measuring 3 mm is likely degenerative in nature. There is otherwise normal alignment of the cervical spine. DEGENERATIVE CHANGES: There is severe multilevel degenerative disc disease and moderate multilevel facet arthropathy throughout the cervical spine. The disc space narrowing is most pronounced at C4-5, C5-6 and C6-7. There is also moderate degenerative arthrosis at C1-2. SOFT TISSUES: There is no prevertebral soft tissue swelling. Atherosclerotic carotid arterial calcifications noted. No acute abnormality of the cervical spine. Moderate to severe multilevel degenerative disc disease and facet arthropathy throughout the cervical spine. If there are neurologic symptoms, MRI could be performed for further evaluation. Ct Thoracic Spine Wo Contrast    Result Date: 4/15/2019  EXAMINATION: CT OF THE CHEST, ABDOMEN, AND PELVIS WITH CONTRAST; CT OF THE THORACIC SPINE WITHOUT CONTRAST; CT OF THE LUMBAR SPINE WITHOUT CONTRAST 4/15/2019 12:16 pm TECHNIQUE: CT of the chest, abdomen and pelvis was performed with the administration of intravenous contrast. Multiplanar reformatted images are provided for review. Dose modulation, iterative reconstruction, and/or weight based adjustment of the mA/kV was utilized to reduce the radiation dose to as low as reasonably achievable.; CT of the thoracic spine was performed without the administration of intravenous contrast. Multiplanar reformatted images are provided for review. Dose modulation, iterative reconstruction, and/or weight based adjustment of the mA/kV was utilized to reduce the radiation dose to as low as reasonably achievable.; CT of the lumbar spine was performed without the administration of intravenous contrast. Multiplanar reformatted images are provided for review. Dose modulation, iterative reconstruction, and/or weight based adjustment of the mA/kV was utilized to reduce the radiation dose to as low as reasonably achievable.  COMPARISON: None HISTORY: ORDERING SYSTEM PROVIDED HISTORY: trauma TECHNOLOGIST PROVIDED HISTORY: Ordering Physician Provided Reason for Exam: found down Acuity: Unknown Type of Exam: Unknown FINDINGS: Chest: Mediastinum: Visualized portions of the thyroid gland are unremarkable. Heart is enlarged without pericardial fluid collection. No evidence for mediastinal hematoma. Aneurysmal dilatation of the ascending aorta measuring 5 x 4.7 cm. Lungs/pleura: Small right pleural effusion. No evidence for pneumothorax. Multiple small calcified nodules are identified bilaterally. No discrete pulmonary contusion. Central airways are patent and clear. Soft Tissues/Bones: Fracture of the right 6th through 9th ribs in multiple locations. .  Right 5th rib fracture anteriorly. Subcutaneous soft tissue edema along the right anterior chest wall and anterior abdomen. Abdomen/Pelvis: Organs: Evaluation of the liver is limited due to extensive motion artifact. No traumatic injury identified. Gallbladder, pancreas, and spleen are without traumatic injury. GI/Bowel: Moderate amount fecal material within the rectosigmoid. Small bowel loops are normal in caliber. No evidence for bowel contusion. No free intraperitoneal air or fluid. Pelvis:  No evidence for free fluid. Peritoneum/Retroperitoneum: The adrenal glands are normal in size and configuration bilaterally. Kidneys perfuse and excrete in a symmetric fashion and ureters are not obstructed. Urinary bladder is unremarkable. Bones/Soft Tissues: Osseous structures are intact without evidence for acute fracture or dislocation. No definite lytic or blastic lesions. Overlying soft tissues are unremarkable. Vasculature: Borderline dilated abdominal aorta measuring 3.3 x 2.7 cm. Iliac arteries are dilated bilaterally. Thoracolumbar spine: No fracture or malalignment of the thoracolumbar spine. Fractures of the right 6th and 9th ribs in multiple locations, laterally and anterolaterally. Mildly displaced right 5th rib fracture. dilatation of the ascending aorta measuring 5 x 4.7 cm. Lungs/pleura: Small right pleural effusion. No evidence for pneumothorax. Multiple small calcified nodules are identified bilaterally. No discrete pulmonary contusion. Central airways are patent and clear. Soft Tissues/Bones: Fracture of the right 6th through 9th ribs in multiple locations. .  Right 5th rib fracture anteriorly. Subcutaneous soft tissue edema along the right anterior chest wall and anterior abdomen. Abdomen/Pelvis: Organs: Evaluation of the liver is limited due to extensive motion artifact. No traumatic injury identified. Gallbladder, pancreas, and spleen are without traumatic injury. GI/Bowel: Moderate amount fecal material within the rectosigmoid. Small bowel loops are normal in caliber. No evidence for bowel contusion. No free intraperitoneal air or fluid. Pelvis:  No evidence for free fluid. Peritoneum/Retroperitoneum: The adrenal glands are normal in size and configuration bilaterally. Kidneys perfuse and excrete in a symmetric fashion and ureters are not obstructed. Urinary bladder is unremarkable. Bones/Soft Tissues: Osseous structures are intact without evidence for acute fracture or dislocation. No definite lytic or blastic lesions. Overlying soft tissues are unremarkable. Vasculature: Borderline dilated abdominal aorta measuring 3.3 x 2.7 cm. Iliac arteries are dilated bilaterally. Thoracolumbar spine: No fracture or malalignment of the thoracolumbar spine. Fractures of the right 6th and 9th ribs in multiple locations, laterally and anterolaterally. Mildly displaced right 5th rib fracture. Hyperdense right pleural effusion, possibly hemothorax. No evidence for fracture or malalignment of the thoracolumbar spine. Cardiomegaly. Mildly dilated ascending aorta measuring 5.0 x 4.7 cm. Borderline dilated infrarenal abdominal aorta measuring 3.3 x 2.7 cm. Managing Abdominal Aortic Aneurysms 3.0-3.4 cm: Every 3 years. Reference: J Vasc Surg. 2009 Oct;50(4 Suppl):S2-49     Xr Chest Portable    Result Date: 4/15/2019  EXAMINATION: SINGLE XRAY VIEW OF THE CHEST 4/15/2019 12:25 pm COMPARISON: None. HISTORY: ORDERING SYSTEM PROVIDED HISTORY: trauma TECHNOLOGIST PROVIDED HISTORY: trauma FINDINGS: Median sternotomy. Prosthetic heart valve. Right-sided pleural effusion and basilar airspace disease. Asymmetric hazy opacities right chest compared with the left possibly edema versus contusion. The left pleural stripe is prominent but this is felt to represent sequela of prominent subpleural fat. A very small pneumothorax cannot entirely be excluded. .  Presumed nasal cannula tubing overlying the chest.  Cardiac leads overlie the chest.     Asymmetric right mid and lower lung opacity compared with the left with associated effusion possibly edema. In the setting of trauma, differential includes contusion. Prominent left pleural stripe felt represent sequela of prominent subpleural fat. A tiny pneumothorax is not entirely excluded. This can be confirmed with the in progress CT chest.     Ct Chest Abdomen Pelvis W Contrast    Result Date: 4/15/2019  EXAMINATION: CT OF THE CHEST, ABDOMEN, AND PELVIS WITH CONTRAST; CT OF THE THORACIC SPINE WITHOUT CONTRAST; CT OF THE LUMBAR SPINE WITHOUT CONTRAST 4/15/2019 12:16 pm TECHNIQUE: CT of the chest, abdomen and pelvis was performed with the administration of intravenous contrast. Multiplanar reformatted images are provided for review. Dose modulation, iterative reconstruction, and/or weight based adjustment of the mA/kV was utilized to reduce the radiation dose to as low as reasonably achievable.; CT of the thoracic spine was performed without the administration of intravenous contrast. Multiplanar reformatted images are provided for review.  Dose modulation, iterative reconstruction, and/or weight based adjustment of the mA/kV was utilized to reduce the radiation dose to as low as reasonably achievable.; CT of the lumbar spine was performed without the administration of intravenous contrast. Multiplanar reformatted images are provided for review. Dose modulation, iterative reconstruction, and/or weight based adjustment of the mA/kV was utilized to reduce the radiation dose to as low as reasonably achievable. COMPARISON: None HISTORY: ORDERING SYSTEM PROVIDED HISTORY: trauma TECHNOLOGIST PROVIDED HISTORY: Ordering Physician Provided Reason for Exam: found down Acuity: Unknown Type of Exam: Unknown FINDINGS: Chest: Mediastinum: Visualized portions of the thyroid gland are unremarkable. Heart is enlarged without pericardial fluid collection. No evidence for mediastinal hematoma. Aneurysmal dilatation of the ascending aorta measuring 5 x 4.7 cm. Lungs/pleura: Small right pleural effusion. No evidence for pneumothorax. Multiple small calcified nodules are identified bilaterally. No discrete pulmonary contusion. Central airways are patent and clear. Soft Tissues/Bones: Fracture of the right 6th through 9th ribs in multiple locations. .  Right 5th rib fracture anteriorly. Subcutaneous soft tissue edema along the right anterior chest wall and anterior abdomen. Abdomen/Pelvis: Organs: Evaluation of the liver is limited due to extensive motion artifact. No traumatic injury identified. Gallbladder, pancreas, and spleen are without traumatic injury. GI/Bowel: Moderate amount fecal material within the rectosigmoid. Small bowel loops are normal in caliber. No evidence for bowel contusion. No free intraperitoneal air or fluid. Pelvis:  No evidence for free fluid. Peritoneum/Retroperitoneum: The adrenal glands are normal in size and configuration bilaterally. Kidneys perfuse and excrete in a symmetric fashion and ureters are not obstructed. Urinary bladder is unremarkable. Bones/Soft Tissues: Osseous structures are intact without evidence for acute fracture or dislocation.   No definite lytic or blastic lesions. Overlying soft tissues are unremarkable. Vasculature: Borderline dilated abdominal aorta measuring 3.3 x 2.7 cm. Iliac arteries are dilated bilaterally. Thoracolumbar spine: No fracture or malalignment of the thoracolumbar spine. Fractures of the right 6th and 9th ribs in multiple locations, laterally and anterolaterally. Mildly displaced right 5th rib fracture. Hyperdense right pleural effusion, possibly hemothorax. No evidence for fracture or malalignment of the thoracolumbar spine. Cardiomegaly. Mildly dilated ascending aorta measuring 5.0 x 4.7 cm. Borderline dilated infrarenal abdominal aorta measuring 3.3 x 2.7 cm. Managing Abdominal Aortic Aneurysms 3.0-3.4 cm: Every 3 years. Reference: J Vasc Surg. 2009 Oct;50(4 Suppl):S2-49            Assessment and Plan     Patient Active Problem List   Diagnosis    Rhabdomyolysis       NEUROLOGIC: AMS, LKW was Friday AM. CT head performed in ED negative. Alert and oriented to name and location.   -Neuro checks per protocol    CARDIOVASCULAR: Hx of A Fib. HR in 130s in ER, started on cardizem drip. Hx of valve replacement. Currently on dig by patient history, but level undetectable in ED. Mildly elevated troponin, follows with Dr. Ya Camilo. On Coumadin, INR 2.5. ProBNP elevated, suggestive of ?CHF. -Cardizem drip for rate control of A Fib, wean as tolerated to keep HR <110 and BP WNL  -Trend troponins q6h, will d/w cards if uptrending  -Need to find recent ECHO results when trauma chart is merged with regular chart, if not will obtain ECHO  -Check INR in AM      PULMONARY: On room air at baseline. Right sided rib fxs 6-9 in multiple places. No clinical flail chest. ?small hemo/pneumothorax on CT. Currently requiring 3L NC.   -Per trauma team, no chest tube at this time  -Incentive spirometry  -Pain control  -Repeat CXR in AM  -O2 to maintain sats >92%    RENAL/FLUID/ELECTROLYTE: Creatinine on admission is 1.23.  Elevated CK and myoglobin- initial lab draw suggestive of rhabdo, but repeat is  and myoglobin. Based on lab review, believe initial labs were in error.  -Strict Is/Os  -Trend CK and myoglobin q6h  -Daily BMP, will consider   -NS at 75/hr   -Continue Vanc and zosyn overnight, will DC in AM if cultures come back negative     GI/NUTRITION:  NUTRITION:  Diet NPO Effective Now  -Advance diet when trauma clears spine    ID:  Afebrile on admission, elevated lactic acid. Septic workup obtained. Cx pending.  -Monitor fever curve  -Monitor WBC  -Trend lactics, appears downtrending (believe lactic of 19.0 was a lab error as repeat improved after resuscitation as expected)    HEMATOLOGIC: Coumadin, INR at 2.5. Hgb stable at 14.0   -Monitor CBC  -Daily INR  -Will hold coumadin today as patient therapeutic and significant bruising    ENDOCRINE:   PROBLEMS:  PLAN:   - monitor blood glucose  - insulin therapy -  Start ISS     OTHER: Falls.  CT head, C spine, thoracic lumbar  -Trauma consulted, awaiting clearance of spine from trauma team      PROPHYLAXIS:     VTE: SCDs    DISPOSITION:   Continue ICU      Yobany Varner MD  Internal Medicine PGY2   4/15/2019, 3:39 PM

## 2019-04-15 NOTE — PROGRESS NOTES
Spoke with Dr. Lucille Gil, trauma resident, re: spinal precautions. Stated that since TLS imaging was negative this can be cleared. Patient to remain in C spine precautions until cleared by trauma.     Mirlande Rawls MD

## 2019-04-15 NOTE — PROGRESS NOTES
Pharmacy Note  Vancomycin Consult    Ap Liz Solano is a 80 y.o. male started on Vancomycin for sepsis; consult received from Dr. Delmis Billings to manage therapy. Also receiving the following antibiotics: zosyn. There is no problem list on file for this patient. Allergies:  Patient has no allergy information on record. Temp max: 37.2    Recent Labs     04/15/19  1221   BUN 55*       Recent Labs     04/15/19  1221 04/15/19  1225   CREATININE 1.23* 0.60       Recent Labs     04/15/19  1221   WBC 10.8       No intake or output data in the 24 hours ending 04/15/19 1310    Culture Date      Source                       Results  4/15                     Blood                          Pending  4/15                     Urine                          Pending    Ht Readings from Last 1 Encounters:   No data found for Ht        Wt Readings from Last 1 Encounters:   No data found for Wt         There is no height or weight on file to calculate BMI. CrCl cannot be calculated (Unknown ideal weight.). Goal Trough Level: 15-20 mcg/mL    Assessment/Plan:  Will initiate vancomycin 1250 mg IV every 24 hours. Timing of trough level will be determined based on culture results, renal function, and clinical response. Thank you for the consult. Will continue to follow. Bryant Gallegos, Pharm. D.

## 2019-04-16 ENCOUNTER — APPOINTMENT (OUTPATIENT)
Dept: GENERAL RADIOLOGY | Age: 84
DRG: 964 | End: 2019-04-16
Payer: MEDICARE

## 2019-04-16 LAB
ANION GAP SERPL CALCULATED.3IONS-SCNC: 13 MMOL/L (ref 9–17)
BUN BLDV-MCNC: 46 MG/DL (ref 8–23)
BUN/CREAT BLD: ABNORMAL (ref 9–20)
CALCIUM SERPL-MCNC: 8.2 MG/DL (ref 8.6–10.4)
CHLORIDE BLD-SCNC: 111 MMOL/L (ref 98–107)
CO2: 21 MMOL/L (ref 20–31)
CREAT SERPL-MCNC: 1.07 MG/DL (ref 0.7–1.2)
CULTURE: NORMAL
EKG ATRIAL RATE: 117 BPM
EKG ATRIAL RATE: 441 BPM
EKG Q-T INTERVAL: 316 MS
EKG Q-T INTERVAL: 328 MS
EKG QRS DURATION: 80 MS
EKG QRS DURATION: 82 MS
EKG QTC CALCULATION (BAZETT): 414 MS
EKG QTC CALCULATION (BAZETT): 433 MS
EKG R AXIS: 16 DEGREES
EKG R AXIS: 70 DEGREES
EKG T AXIS: 169 DEGREES
EKG T AXIS: 85 DEGREES
EKG VENTRICULAR RATE: 113 BPM
EKG VENTRICULAR RATE: 96 BPM
FOLATE: >20 NG/ML
GFR AFRICAN AMERICAN: >60 ML/MIN
GFR NON-AFRICAN AMERICAN: >60 ML/MIN
GFR SERPL CREATININE-BSD FRML MDRD: ABNORMAL ML/MIN/{1.73_M2}
GFR SERPL CREATININE-BSD FRML MDRD: ABNORMAL ML/MIN/{1.73_M2}
GLUCOSE BLD-MCNC: 120 MG/DL (ref 75–110)
GLUCOSE BLD-MCNC: 133 MG/DL (ref 75–110)
GLUCOSE BLD-MCNC: 74 MG/DL (ref 75–110)
GLUCOSE BLD-MCNC: 87 MG/DL (ref 75–110)
GLUCOSE BLD-MCNC: 95 MG/DL (ref 70–99)
GLUCOSE BLD-MCNC: 95 MG/DL (ref 75–110)
HCT VFR BLD CALC: 35.9 % (ref 40.7–50.3)
HEMOGLOBIN: 11.1 G/DL (ref 13–17)
INR BLD: 3.7
LACTIC ACID, WHOLE BLOOD: 1.5 MMOL/L (ref 0.7–2.1)
LACTIC ACID, WHOLE BLOOD: 1.9 MMOL/L (ref 0.7–2.1)
LACTIC ACID: NORMAL MMOL/L
LV EF: 50 %
LVEF MODALITY: NORMAL
Lab: NORMAL
MCH RBC QN AUTO: 29.9 PG (ref 25.2–33.5)
MCHC RBC AUTO-ENTMCNC: 30.9 G/DL (ref 28.4–34.8)
MCV RBC AUTO: 96.8 FL (ref 82.6–102.9)
MRSA, DNA, NASAL: NORMAL
MYOGLOBIN: 1186 NG/ML (ref 28–72)
MYOGLOBIN: 706 NG/ML (ref 28–72)
MYOGLOBIN: 995 NG/ML (ref 28–72)
NRBC AUTOMATED: 0 PER 100 WBC
PDW BLD-RTO: 15.9 % (ref 11.8–14.4)
PLATELET # BLD: 167 K/UL (ref 138–453)
PMV BLD AUTO: 11.4 FL (ref 8.1–13.5)
POTASSIUM SERPL-SCNC: 3.9 MMOL/L (ref 3.7–5.3)
PROTHROMBIN TIME: 35.6 SEC (ref 9–12)
RBC # BLD: 3.71 M/UL (ref 4.21–5.77)
SODIUM BLD-SCNC: 145 MMOL/L (ref 135–144)
SPECIMEN DESCRIPTION: NORMAL
SPECIMEN DESCRIPTION: NORMAL
TOTAL CK: 1064 U/L (ref 39–308)
TOTAL CK: 1121 U/L (ref 39–308)
TOTAL CK: 736 U/L (ref 39–308)
TROPONIN INTERP: ABNORMAL
TROPONIN T: ABNORMAL NG/ML
TROPONIN, HIGH SENSITIVITY: 85 NG/L (ref 0–22)
VITAMIN B-12: 1572 PG/ML (ref 232–1245)
WBC # BLD: 8.9 K/UL (ref 3.5–11.3)

## 2019-04-16 PROCEDURE — G8978 MOBILITY CURRENT STATUS: HCPCS

## 2019-04-16 PROCEDURE — 85610 PROTHROMBIN TIME: CPT

## 2019-04-16 PROCEDURE — 2000000000 HC ICU R&B

## 2019-04-16 PROCEDURE — 71045 X-RAY EXAM CHEST 1 VIEW: CPT

## 2019-04-16 PROCEDURE — 97162 PT EVAL MOD COMPLEX 30 MIN: CPT

## 2019-04-16 PROCEDURE — 84484 ASSAY OF TROPONIN QUANT: CPT

## 2019-04-16 PROCEDURE — 2580000003 HC RX 258: Performed by: STUDENT IN AN ORGANIZED HEALTH CARE EDUCATION/TRAINING PROGRAM

## 2019-04-16 PROCEDURE — 97530 THERAPEUTIC ACTIVITIES: CPT

## 2019-04-16 PROCEDURE — 82550 ASSAY OF CK (CPK): CPT

## 2019-04-16 PROCEDURE — 83605 ASSAY OF LACTIC ACID: CPT

## 2019-04-16 PROCEDURE — G8979 MOBILITY GOAL STATUS: HCPCS

## 2019-04-16 PROCEDURE — 51798 US URINE CAPACITY MEASURE: CPT

## 2019-04-16 PROCEDURE — 6370000000 HC RX 637 (ALT 250 FOR IP): Performed by: STUDENT IN AN ORGANIZED HEALTH CARE EDUCATION/TRAINING PROGRAM

## 2019-04-16 PROCEDURE — 80048 BASIC METABOLIC PNL TOTAL CA: CPT

## 2019-04-16 PROCEDURE — 85027 COMPLETE CBC AUTOMATED: CPT

## 2019-04-16 PROCEDURE — 36415 COLL VENOUS BLD VENIPUNCTURE: CPT

## 2019-04-16 PROCEDURE — 83874 ASSAY OF MYOGLOBIN: CPT

## 2019-04-16 PROCEDURE — 93306 TTE W/DOPPLER COMPLETE: CPT

## 2019-04-16 PROCEDURE — 2500000003 HC RX 250 WO HCPCS: Performed by: STUDENT IN AN ORGANIZED HEALTH CARE EDUCATION/TRAINING PROGRAM

## 2019-04-16 PROCEDURE — 73060 X-RAY EXAM OF HUMERUS: CPT

## 2019-04-16 PROCEDURE — 99212 OFFICE O/P EST SF 10 MIN: CPT

## 2019-04-16 PROCEDURE — 73030 X-RAY EXAM OF SHOULDER: CPT

## 2019-04-16 PROCEDURE — 92610 EVALUATE SWALLOWING FUNCTION: CPT

## 2019-04-16 PROCEDURE — 99291 CRITICAL CARE FIRST HOUR: CPT | Performed by: INTERNAL MEDICINE

## 2019-04-16 RX ORDER — 0.9 % SODIUM CHLORIDE 0.9 %
500 INTRAVENOUS SOLUTION INTRAVENOUS ONCE
Status: COMPLETED | OUTPATIENT
Start: 2019-04-16 | End: 2019-04-16

## 2019-04-16 RX ADMIN — Medication 10 ML: at 08:16

## 2019-04-16 RX ADMIN — DONEPEZIL HYDROCHLORIDE 10 MG: 10 TABLET, FILM COATED ORAL at 19:39

## 2019-04-16 RX ADMIN — Medication 10 ML: at 19:39

## 2019-04-16 RX ADMIN — MIDODRINE HYDROCHLORIDE 5 MG: 5 TABLET ORAL at 08:17

## 2019-04-16 RX ADMIN — DEXTROSE AND SODIUM CHLORIDE: 5; 450 INJECTION, SOLUTION INTRAVENOUS at 19:54

## 2019-04-16 RX ADMIN — MIDODRINE HYDROCHLORIDE 5 MG: 5 TABLET ORAL at 14:42

## 2019-04-16 RX ADMIN — DEXTROSE AND SODIUM CHLORIDE: 5; 450 INJECTION, SOLUTION INTRAVENOUS at 10:00

## 2019-04-16 RX ADMIN — VITAMIN D, TAB 1000IU (100/BT) 2000 UNITS: 25 TAB at 08:17

## 2019-04-16 RX ADMIN — MIDODRINE HYDROCHLORIDE 5 MG: 5 TABLET ORAL at 19:39

## 2019-04-16 RX ADMIN — SODIUM CHLORIDE 500 ML: 9 INJECTION, SOLUTION INTRAVENOUS at 00:19

## 2019-04-16 RX ADMIN — SIMVASTATIN 10 MG: 10 TABLET, FILM COATED ORAL at 19:39

## 2019-04-16 RX ADMIN — Medication 3 MCG/MIN: at 01:57

## 2019-04-16 RX ADMIN — SODIUM CHLORIDE: 9 INJECTION, SOLUTION INTRAVENOUS at 08:28

## 2019-04-16 ASSESSMENT — PAIN SCALES - PAIN ASSESSMENT IN ADVANCED DEMENTIA (PAINAD)
NEGVOCALIZATION: 1
NEGVOCALIZATION: 1
TOTALSCORE: 3
TOTALSCORE: 3
CONSOLABILITY: 0
BREATHING: 1
BREATHING: 1
BREATHING: 0
BODYLANGUAGE: 1
BODYLANGUAGE: 1
CONSOLABILITY: 0
BREATHING: 1
TOTALSCORE: 3
BREATHING: 1
BREATHING: 1
BODYLANGUAGE: 1
TOTALSCORE: 3
FACIALEXPRESSION: 0
FACIALEXPRESSION: 0
CONSOLABILITY: 0
TOTALSCORE: 3
FACIALEXPRESSION: 0
NEGVOCALIZATION: 1
TOTALSCORE: 3
CONSOLABILITY: 0
CONSOLABILITY: 0
BREATHING: 1
CONSOLABILITY: 0
NEGVOCALIZATION: 1
NEGVOCALIZATION: 1
BREATHING: 1
BODYLANGUAGE: 1
CONSOLABILITY: 0
NEGVOCALIZATION: 1
BODYLANGUAGE: 1
TOTALSCORE: 3
FACIALEXPRESSION: 0
FACIALEXPRESSION: 0
BODYLANGUAGE: 1
NEGVOCALIZATION: 1
BODYLANGUAGE: 0
BODYLANGUAGE: 1
BODYLANGUAGE: 1
TOTALSCORE: 1
FACIALEXPRESSION: 0
CONSOLABILITY: 0
NEGVOCALIZATION: 1
CONSOLABILITY: 0
TOTALSCORE: 3
FACIALEXPRESSION: 0
BREATHING: 1
NEGVOCALIZATION: 1
FACIALEXPRESSION: 0
FACIALEXPRESSION: 0

## 2019-04-16 ASSESSMENT — PAIN SCALES - GENERAL
PAINLEVEL_OUTOF10: 0

## 2019-04-16 NOTE — PROGRESS NOTES
Occupational Therapy    Occupational Therapy Not Seen Note    DATE: 2019  Name: Jenniffer James  : 10/10/1924  MRN: [id number]    Patient not available for Occupational Therapy due to: Other: Pt eating lunch, pt had just been transferred to a new bed and worked with PT this am-very fatigued. Jennifer Watson Next Scheduled Treatment: Chk on  as appropriate.      Electronically signed by Renetta Garcia OT on 2019 at 2:51 PM

## 2019-04-16 NOTE — PROGRESS NOTES
C- Spine Evaluation for Spine Clearance:    Pt is a 80 y.o. male who was admitted on 4/15 s/p being found down in a bathtub. Pt w/ complaints of mild neck pain. C-Spine precautions of C-collar with spinal neutrality maintained since arrival with current exam directed at further evaluation of spine for clearance purposes. Pt chart and current images reviewed. CT C-Spine negative for acute fracture, subluxation, or traumatic injury. C-spine is considered cleared w/out need for further imaging, evaluation, or continuation of c-collar. TLS considered clear w/out need for further imagine, evaluation, or continuation of supine bedrest precautions.     Electronically signed by Sarmad Frausto DO on 4/15/2019 at 8:35 PM

## 2019-04-16 NOTE — PROGRESS NOTES
aspirin 81 MG EC tablet Take 81 mg by mouth every evening      digoxin (LANOXIN) 125 MCG tablet Take 125 mcg by mouth daily      diltiazem (CARDIZEM 12 HR) 120 MG extended release capsule Take 120 mg by mouth daily      donepezil (ARICEPT) 10 MG tablet Take 10 mg by mouth nightly      furosemide (LASIX) 40 MG tablet Take 40 mg by mouth 2 times daily      lovastatin (MEVACOR) 20 MG tablet Take 20 mg by mouth nightly      Omega-3 Fatty Acids (FISH OIL) 1200 MG CAPS Take 1 capsule by mouth 2 times daily      metoprolol tartrate (LOPRESSOR) 25 MG tablet Take 12.5 mg by mouth 2 times daily      midodrine (PROAMATINE) 5 MG tablet Take 5 mg by mouth 3 times daily      Cholecalciferol (VITAMIN D3) 1000 units CAPS Take 2 capsules by mouth daily      warfarin (COUMADIN) 5 MG tablet Take 7.5 mg by mouth Take 7.5 mg on tues, thurs and Saturday.  Take 5 mg on mon, wed, fri, sunday         Objective    BP (!) 132/92   Pulse 99   Temp 98.8 °F (37.1 °C) (Oral)   Resp 21   Ht 5' 7\" (1.702 m)   Wt 170 lb 10.2 oz (77.4 kg)   SpO2 97%   BMI 26.73 kg/m²     LABS:  WBC:    Lab Results   Component Value Date    WBC 8.9 04/16/2019     H/H:    Lab Results   Component Value Date    HGB 11.1 04/16/2019    HCT 35.9 04/16/2019     PTT:    Lab Results   Component Value Date    APTT 28.3 04/15/2019   [APTT}  PT/INR:    Lab Results   Component Value Date    PROTIME 35.6 04/16/2019    INR 3.7 04/16/2019     HgBA1c:  No results found for: LABA1C    Assessment   Steven Risk Score: Steven Scale Score: 14    Patient Active Problem List   Diagnosis Code    Rhabdomyolysis M62.82       Measurements:     04/16/19 0930   Wound 04/15/19 Elbow Right purple, black suspected deep tissue injury   Date First Assessed/Time First Assessed: 04/15/19 1508   Present on Hospital Admission: Yes  Primary Wound Type: Traumatic  Location: (!) Elbow  Wound Location Orientation: Right  Wound Description (Comments): purple, black suspected deep tissue injury
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
## 2019-04-16 NOTE — PROGRESS NOTES
Trauma Tertiary Survey    Admit Date: 4/15/2019  Hospital day 0    Found down     No past medical history on file. Scheduled Meds:   vancomycin (VANCOCIN) intermittent dosing (placeholder)   Other RX Placeholder    [START ON 4/16/2019] vancomycin  1,250 mg Intravenous Q24H    sodium chloride flush  10 mL Intravenous 2 times per day    vitamin D  2,000 Units Oral Daily    donepezil  10 mg Oral Nightly    simvastatin  10 mg Oral Nightly    midodrine  5 mg Oral TID    sodium chloride flush  10 mL Intravenous 2 times per day    insulin lispro  0-12 Units Subcutaneous TID WC    insulin lispro  0-6 Units Subcutaneous Nightly    lidocaine  1 patch Transdermal Daily     Continuous Infusions:   diltiazem (CARDIZEM) 125 mg in dextrose 5% 125 mL infusion 2.5 mg/hr (04/15/19 2021)    sodium chloride 75 mL/hr at 04/15/19 1737    dextrose       PRN Meds:sodium chloride flush, ondansetron, sodium chloride flush, magnesium hydroxide, ondansetron, acetaminophen, potassium chloride, magnesium sulfate, glucose, dextrose, glucagon (rDNA), dextrose, fentanNYL    Subjective:     Patient has baseline dementia and is not alert or oriented.     Objective:     Patient Vitals for the past 8 hrs:   BP Temp Temp src Pulse Resp SpO2 Height Weight   04/15/19 2050 (!) 80/51 -- -- 101 26 95 % -- --   04/15/19 2045 (!) 92/59 -- -- 102 22 99 % -- --   04/15/19 2040 (!) 80/51 -- -- 94 16 100 % -- --   04/15/19 2035 (!) 89/51 -- -- 99 15 100 % -- --   04/15/19 2030 (!) 88/57 -- -- 96 25 100 % -- --   04/15/19 2025 98/72 -- -- 104 17 100 % -- --   04/15/19 2020 98/69 -- -- 101 18 93 % -- --   04/15/19 2015 95/65 -- -- 103 18 100 % -- --   04/15/19 2010 (!) 91/52 -- -- 108 16 -- -- --   04/15/19 2005 (!) 76/45 -- -- 110 18 100 % -- --   04/15/19 2000 (!) 82/46 -- -- 112 16 100 % -- --   04/15/19 1945 (!) 69/46 -- -- 113 20 100 % -- --   04/15/19 1940 (!) 68/43 -- -- 113 19 100 % -- --   04/15/19 1930 (!) 76/40 -- -- 109 18 100 % -- --   04/15/19 1915 85/61 -- -- 108 20 -- -- --   04/15/19 1900 (!) 73/53 -- -- 110 18 -- -- --   04/15/19 1845 -- -- -- 111 15 -- -- --   04/15/19 1830 90/64 -- -- 110 22 -- -- --   04/15/19 1815 -- -- -- 106 18 -- -- --   04/15/19 1800 103/67 -- -- 109 19 100 % -- --   04/15/19 1745 104/62 -- -- 107 22 -- -- --   04/15/19 1730 102/66 -- -- 99 16 -- -- --   04/15/19 1715 108/69 -- -- 109 20 -- -- --   04/15/19 1700 99/65 -- -- 105 18 100 % -- --   04/15/19 1645 101/68 -- -- 101 15 -- -- --   04/15/19 1638 -- -- -- -- 21 100 % -- --   04/15/19 1600 (!) 118/59 -- -- 112 18 99 % -- --   04/15/19 1500 (!) 134/94 98.6 °F (37 °C) Axillary 102 12 -- 5' 7\" (1.702 m) 168 lb 10.4 oz (76.5 kg)   04/15/19 1349 -- -- -- -- -- -- -- 220 lb 7.4 oz (100 kg)       No intake/output data recorded. I/O this shift: In: 452 [I.V.:452]  Out: 600 [Urine:600]    PHYSICAL EXAM:   GCS:  3 - Opens eyes to loud noise or command   6 - Follows simple motor commands  3 - Talks, but nonsensical    Pupil size:  Left 3 mm Right 3 mm  Pupil reaction: Yes  Wiggles fingers: Left Yes Right Yes  Hand grasp:   Left normal   Right normal  Wiggles toes: Left Yes    Right Yes  Plantar flexion: Left normal  Right normal    General Appearance: AAOx0  Skin: warm and dry, multiple areas of ecchymoses over the right upper extremity and right abdomen.   Head: normocephalic and atraumatic   Eyes: PERRLA  Nose: nose without deformity  Neck: mild cervical tenderness  Back: no abrasion, step offs  Pulmonary/Chest: CTAB, good air entry bilaterally  Cardiovascular: normal rate, regular rhythm  Abdomen: soft, non-tender, non-distended  Extremities: Edema to right upper extremity, compartments soft and nontender  Neurologic: moving all extremities, 2/4 pulses in upper and lower extremities      Spine:     Spine Tenderness ROM   Cervical 0 /10 Normal   Thoracic 0 /10 Not Indicated   Lumbar 0 /10 Not Indicated     Musculoskeletal    Joint Tenderness Swelling ROM   Right shoulder absent absent normal   Left shoulder absent absent normal   Right elbow absent absent normal   Left elbow absent absent normal   Right wrist absent absent normal   Left wrist absent absent normal   Right hand grasp absent absent normal   Left hand grasp absent absent normal   Right hip absent absent normal   Left hip absent absent normal   Right knee absent absent normal   Left knee absent absent normal   Right ankle absent absent normal   Left ankle absent absent normal   Right foot absent absent normal   Left foot absent absent normal           CONSULTS: Critical care    PROCEDURES:    INJURIES:  Rhabdomyolysis  Right rib fractures  Right pleural effusion      Patient Active Problem List   Diagnosis    Rhabdomyolysis         Assessment/Plan:     · Patient admitted to critical care  · Suggest incentive spirometry and multimodal pain control for rib fractures  · Medical treatment per primary of the rhabdomyolysis and pleural effusion  · No other traumatic injuries identified  · Trauma surgery will sign off at this time

## 2019-04-16 NOTE — PROGRESS NOTES
No further surgery intervention indicated or planned at this time. We will sign off at this time. Please reconsult/call if additional questions, concerns, or issues develop requiring further surgery input. Thank you for this interesting consult.     Electronically signed by Wandy Berman DO on 4/16/2019 at 1:57 AM

## 2019-04-16 NOTE — PROGRESS NOTES
Nutrition Assessment    Type and Reason for Visit: Initial, Positive Nutrition Screen(wounds)    Nutrition Recommendations: Start oral diet per ST recs, Start ONS. Will continue to follow. Nutrition Assessment: Unable to speak with pt at time of visit d/t testing. ST just completed swallow evaluation. Pt nutritionally compromised as evidenced by deep tissue injuries and swallowing difficulty. Will recommend diet consistency per ST recommendations and supplements as appropriate. Malnutrition Assessment:  · Malnutrition Status: Insufficient data  · Context: Acute illness or injury  · Findings of the 6 clinical characteristics of malnutrition (Minimum of 2 out of 6 clinical characteristics is required to make the diagnosis of moderate or severe Protein Calorie Malnutrition based on AND/ASPEN Guidelines):  1. Energy Intake-Unable to assess, Unable to assess    2. Weight Loss-Unable to assess,    3. Fat Loss-Unable to assess,    4. Muscle Loss-Unable to assess,    5. Fluid Accumulation-Unable to assess,    6.  Strength-Not measured    Nutrition Risk Level:  Moderate    Nutrient Needs:  · Estimated Daily Total Kcal: 1900 kcal/day   · Estimated Daily Protein (g):  g pro/day     Nutrition Diagnosis:   · Problem: Increased nutrient needs   · Etiology: related to healing   Signs and symptoms:  as evidenced by deep tissue injuries     Objective Information:  · Wound Type: Deep Tissue Injury(multiple)  · Current Nutrition Therapies:  · Oral Diet Orders: NPO-advancing to Pureed diet  · Anthropometric Measures:  · Ht: 5' 7\" (170.2 cm)   · Current Body Wt: 170 lb 10.2 oz (77.4 kg)  · Ideal Body Wt: 148 lb (67.1 kg), % Ideal Body 115%  · BMI Classification: BMI 25.0 - 29.9 Overweight    Nutrition Interventions:   Start oral diet, Start ONS  Continued Inpatient Monitoring, Education Not Indicated    Nutrition Evaluation:   · Evaluation: Goals set   · Goals: meet % of estimated nutrition needs · Monitoring: Meal Intake, Supplement Intake, Diet Tolerance, Chewing/Swallowing, Skin Integrity, Weight, Pertinent Labs      Electronically signed by Gregg Anderson RD, YAIR on 4/16/19 at 1:22 PM    Contact Number: [phone]

## 2019-04-16 NOTE — PROGRESS NOTES
Physical Therapy    Facility/Department: [address] SICU  Initial Assessment    NAME: Hilario Castellon  : 10/10/1924  MRN: [id number]    Date of Service: 2019  Pt fell in bathtub and unable to get up; down unknown amount of time. Discharge Recommendations:    Further therapy recommended at discharge. PT Equipment Recommendations  Equipment Needed: (TBD)    Assessment    Pt cooperative, but very fearful of falling which interferes with mobility. Currently unsafe with all mobility. Normally he lives alone and ambulates with a quad cane  Body structures, Functions, Activity limitations: Decreased functional mobility ; Decreased cognition;Decreased endurance;Decreased balance  Prognosis: Fair  Decision Making: Medium Complexity  Patient Education: PT POC  Barriers to Learning: none  REQUIRES PT FOLLOW UP: Yes  Activity Tolerance  Activity Tolerance: Other(pt EXTREMELY fearful of falling which interfered with his function)       Patient Diagnosis(es): The primary encounter diagnosis was Traumatic rhabdomyolysis, initial encounter ([facility] [address] Diagnoses of Closed fracture of multiple ribs of right side, initial encounter, Dehydration, Lactic acidosis, Abdominal aortic aneurysm (AAA) without rupture ([facility] [address] and Contusion of right lung, initial encounter were also pertinent to this visit. has no past medical history on file. has no past surgical history on file.     Restrictions  Restrictions/Precautions  Restrictions/Precautions: General Precautions, Fall Risk, Up as Tolerated  Required Braces or Orthoses?: No  Vision/Hearing  Vision: Within Functional Limits  Hearing: Within functional limits     Subjective  General  Patient assessed for rehabilitation services?: Yes  Response To Previous Treatment: Not applicable  Family / Caregiver Present: No  Follows Commands: Within Functional Limits  Pain Screening  Patient Currently in Pain: Denies    Orientation  Orientation  Overall Orientation Status: Impaired  Orientation Level: Oriented to person;Oriented to situation;Disoriented to place; Disoriented to time  Social/Functional History  Social/Functional History  Lives With: Alone  Type of Home: House  Ambulation Assistance: Independent  Transfer Assistance: Independent  Mode of Transportation: Family    Objective  PROM RLE (degrees)  RLE PROM: WFL  PROM LLE (degrees)  LLE PROM: WFL  PROM RUE (degrees)  RUE PROM: WFL  PROM LUE (degrees)  LUE PROM: WFL  Strength RLE  Strength RLE: WFL  Strength LLE  Strength LLE: WFL  Strength RUE  Strength RUE: Exception--shoulder grossly 3/5; other 4-/5   Strength LUE  Strength LUE: WFL  Tone RLE  RLE Tone: Normotonic  Tone LLE  LLE Tone: Normotonic  Sensation  Overall Sensation Status: WFL  Bed mobility  Rolling to Right: Moderate assistance  Supine to Sit: Moderate assistance  Sit to Supine: Moderate assistance  Scooting: Moderate assistance  Transfers  Sit to Stand: Maximum Assistance--pt unable to fully stand x 3 reps without use of arvin stedy. Able to standx4 reps using arvin stedy, but required max A and still fearful of falling. Recommend transfer to [facility] rather than toilet for ability to raise the arvin stedy seat on both sides. RN notified. Stand to sit: Maximum Assistance+1  Bed to Chair: Dependent/Total(arvin stedy)+1  Stand Pivot Transfers: Dependent/Total(arvin stedy)+1  Max A+1 for hygiene in [address] [place] [address] after using toilet.     Ambulation  Ambulation?: No  Stairs/Curb  Stairs?: No     Balance  Posture: Fair  Sitting - Static: Fair  Sitting - Dynamic: Poor  Standing - Static: Poor  Standing - Dynamic: Poor  Other exercises  Other exercises 1: AAROM R shoulder; AROM all other joints x 5-10 reps     Plan   Plan  Times per week: 6-12 visits weekly   Times per day: (1-2 visits daily)  Current Treatment Recommendations: Strengthening, ROM, Balance Training, Functional Mobility Training, Transfer Training, Cognitive/Perceptual Training, Endurance Training, Wheelchair Mobility Training, Gait

## 2019-04-16 NOTE — PROGRESS NOTES
referred for a bedside swallow evaluation to assess the efficiency of his swallow function, identify signs and symptoms of aspiration and make recommendations regarding safe dietary consistencies, effective compensatory strategies, and safe eating environment. Impression  Pt. Presents with + s/s of aspiration with soft solids. No s/s of aspiration with thin liquid, nectar thick liquid and puree. + swallow delay/decreased A-P transit with all consistencies tested. Pt. Provided with education: safe swallow strategies. Pt. Verbalized understanding. ST to recommend level I puree diet with thin liquid. If s/s of aspiration occur, d/c all PO and recommend video swallow study to r/o/confirm aspiration. Results and recommendations reported to RN. Treatment Plan  Requires SLP Intervention: Yes  Duration/Frequency of Treatment: 3-5 x week           Recommended Diet and Intervention  Diet Solids Recommendation: Dysphagia I Pureed  Liquid Consistency Recommendation: Thin  Recommended Form of Meds: Meds in puree     Therapeutic Interventions: Oral motor exercises;Mendelsohn;Diet tolerance monitoring;Patient/Family education    Compensatory Swallowing Strategies  Compensatory Swallowing Strategies: Small bites/sips;Eat/Feed slowly;Upright as possible for all oral intake    Treatment/Goals  Short-term Goals  Goal 1: Pt. will complete OMEX for dysphagia 10-20 x per session. Dysphagia Goals: The patient will tolerate recommended diet without observed clinical signs of aspiration    General  Temperature Spikes Noted: No  Respiratory Status: O2 via nasual cannula  O2 Device: Nasal cannula  Communication Observation: Functional  Follows Directions: Simple  Dentition: Adequate  Patient Positioning: Upright in bed  Prior Dysphagia History: Per RN, pt. was coughing on water   Consistencies Administered: Soft solid; Nectar - teaspoon;Nectar - straw; Thin - teaspoon; Thin - straw;Puree    Oral Motor Deficits  Oral/Motor  Oral Motor: Within functional limits    Oral Phase Dysfunction  Oral Phase  Oral Phase: Exceptions  Oral Phase  Oral Phase - Comment: + swallow delay/decreased A-P transit with all consistencies tested. Indicators of Pharyngeal Phase Dysfunction   Pharyngeal Phase  Pharyngeal Phase: Exceptions  Pharyngeal Phase   Pharyngeal: + s/s of aspiration with soft solid. No s/s of aspiration with thin liquid, nectar thick liquid and puree. Prognosis  Prognosis  Prognosis for safe diet advancement: fair  Individuals consulted  Consulted and agree with results and recommendations: Patient; Family member;RN    Education  Patient Education: yes  Patient Education Response: Verbalizes understanding             Therapy Time  SLP Individual Minutes  Time In: 2974  Time Out: 1606  Minutes: Elvia Bloch, M.A.  CCC-SLP  4/16/2019 1:09 PM

## 2019-04-16 NOTE — PROGRESS NOTES
INTENSIVE CARE UNIT  Resident Physician Progress Note    Patient - Piper Starks  Date of Admission -  4/15/2019 11:59 AM  Date of Evaluation -  2019  Room and Bed Number -  0104/0104-01   Hospital Day - 1      SUBJECTIVE:     OVERNIGHT EVENTS:    Briefly started on pressors overnight. Much more awake and alert this AM, remembers being unable to step out of bathtub.     TODAY:      AWAKE & FOLLOWING COMMANDS:  [] No   [x] Yes    SECRETIONS Amount:  [] Small [] Moderate  [] Large  [x] None  Color:     [] White [] Colored  [] Bloody    SEDATION:  RAAS Score:  [] Propofol gtt  [] Versed gtt  [] Ativan gtt   [x] No Sedation    PARALYZED:  [x] No    [] Yes    VASOPRESSORS:  [x] No    [] Yes  [] Levophed [] Dopamine [] Vasopressin  [] Dobutamine [] Phenylephrine [] Epinephrine      OBJECTIVE:     VITAL SIGNS:  /81   Pulse 100   Temp 98.1 °F (36.7 °C) (Oral)   Resp 22   Ht 5' 7\" (1.702 m)   Wt 168 lb 10.4 oz (76.5 kg)   SpO2 95%   BMI 26.41 kg/m²   Tmax over 24 hours:  Temp (24hrs), Av.2 °F (36.8 °C), Min:98.1 °F (36.7 °C), Max:98.6 °F (37 °C)      Patient Vitals for the past 8 hrs:   BP Temp Temp src Pulse Resp SpO2   19 0700 132/81 -- -- 100 22 95 %   19 0645 (!) 128/96 -- -- 102 22 99 %   19 0630 117/71 -- -- 107 18 99 %   19 0615 (!) 101/59 -- -- 100 14 97 %   19 0600 95/60 -- -- 96 15 100 %   19 0545 (!) 145/85 -- -- 108 26 100 %   19 0530 (!) 149/109 -- -- 105 23 100 %   19 0515 (!) 101/57 -- -- 98 14 100 %   19 0500 (!) 97/55 -- -- 95 15 100 %   19 0445 103/75 -- -- 99 19 100 %   19 0430 (!) 139/93 -- -- 108 25 100 %   19 0415 (!) 134/92 -- -- 107 19 100 %   19 0400 114/64 98.1 °F (36.7 °C) Oral 97 15 100 %   19 0345 96/73 -- -- 100 22 100 %   19 0330 118/60 -- -- 94 19 100 %   19 0315 (!) 104/49 -- -- 97 21 100 %   19 0245 -- -- -- 102 23 100 %   19 0230 114/70 -- -- 99 23 100 % 04/15/2019       Comprehensive Metabolic Profile:   Recent Labs     04/15/19  1221 04/15/19  1225 04/15/19  1925 04/16/19  0406     --  145* 145*   K 4.7  --  4.1 3.9     --  106 111*   CO2 21  --  24 21   BUN 55*  --  55* 46*   CREATININE 1.23* 0.60 1.06 1.07   GLUCOSE 121*  --  105* 95   CALCIUM  --   --  8.7 8.2*      Magnesium:   Lab Results   Component Value Date    MG 2.5 04/15/2019     Phosphorus:   Lab Results   Component Value Date    PHOS 4.3 04/15/2019     Ionized Calcium: No results found for: CAION     Urinalysis: Lab Results   Component Value Date    NITRU NEGATIVE 04/15/2019    COLORU YELLOW 04/15/2019    PHUR 5.0 04/15/2019    WBCUA 2 TO 5 04/15/2019    RBCUA 0 TO 2 04/15/2019    MUCUS 2+ 04/15/2019    TRICHOMONAS NOT REPORTED 04/15/2019    YEAST NOT REPORTED 04/15/2019    BACTERIA FEW 04/15/2019    SPECGRAV 1.038 04/15/2019    LEUKOCYTESUR NEGATIVE 04/15/2019    UROBILINOGEN Normal 04/15/2019    BILIRUBINUR NEGATIVE 04/15/2019    GLUCOSEU NEGATIVE 04/15/2019    KETUA TRACE 04/15/2019    AMORPHOUS NOT REPORTED 04/15/2019       HgBA1c:  No results found for: LABA1C  TSH:  No results found for: TSH  Lactic Acid:   Lab Results   Component Value Date    LACTA NOT REPORTED 04/16/2019      Troponin: No results for input(s): TROPONINI in the last 72 hours. ASSESSMENT:     Patient Active Problem List    Diagnosis Date Noted    Rhabdomyolysis 04/15/2019          PLAN:     WEAN PER PROTOCOL:  [] No   [x] Yes  [] N/A    ICU PROPHYLAXIS:  Stress ulcer:  [] PPI Agent  [] N2Sfxyg [] Sucralfate  [] Other:  VTE:   [] Enoxaparin  [] Unfract. Heparin Subcut  [x] EPC Cuffs    NUTRITION:  [x] NPO [] Tube Feeding (Specify: ) [] TPN  [] PO    HOME MEDS RECONCILED: [] No  [x] Yes    CONSULTATION NEEDED:  [] No  [x] Yes    FAMILY UPDATED:    [x] No  [] Yes    TRANSFER OUT OF ICU:   [x] No  [] Yes    NEUROLOGIC: AMS, LKW was Friday AM. CT head performed in ED negative.  Alert and oriented to name and location.   -Neuro checks per protocol     CARDIOVASCULAR: Hx of A Fib. HR in 130s in ER, started on cardizem drip. Hx of valve replacement. Currently on dig by patient history, but level undetectable in ED. Mildly elevated troponin, follows with Dr. Christian Clemente. On Coumadin, INR 2.5. ProBNP elevated, suggestive of ?CHF. -Cardizem drip for rate control of A Fib, wean as tolerated to keep HR <110 and BP WNL  -Trend troponins q6h, will d/w cards if uptrending  -Need to find recent ECHO results when trauma chart is merged with regular chart, if not will obtain ECHO  -Check INR in AM        PULMONARY: On room air at baseline. Right sided rib fxs 6-9 in multiple places. No clinical flail chest. ?small hemo/pneumothorax on CT. Currently requiring 3L NC.   -Per trauma team, no chest tube at this time  -Incentive spirometry  -Pain control  -Repeat CXR in AM  -O2 to maintain sats >92%     RENAL/FLUID/ELECTROLYTE: Creatinine on admission is 1.23. Elevated CK and myoglobin- initial lab draw suggestive of rhabdo, but repeat is  and myoglobin. Based on lab review, believe initial labs were in error.  -Strict Is/Os  -Trend CK and myoglobin q6h  -Daily BMP, will consider   -NS at 75/hr   -Continue Vanc and zosyn overnight, will DC in AM if cultures come back negative      GI/NUTRITION:  NUTRITION:  Diet NPO Effective Now  -Advance diet when trauma clears spine     ID: Afebrile on admission, elevated lactic acid. Septic workup obtained. Cx pending.  -Monitor fever curve  -Monitor WBC  -Trend lactics, appears downtrending (believe lactic of 19.0 was a lab error as repeat improved after resuscitation as expected)     HEMATOLOGIC: Coumadin, INR at 2.5. Hgb stable at 14.0   -Monitor CBC  -Daily INR  -Will hold coumadin today as patient therapeutic and significant bruising     ENDOCRINE:   PROBLEMS:  PLAN:   - monitor blood glucose  - insulin therapy -  Start ISS      OTHER: Falls.  CT head, C spine, thoracic lumbar  -Trauma consulted, awaiting clearance of spine from trauma team        PROPHYLAXIS:                 VTE: SCDs     DISPOSITION:              Continue ICU        Internal Medicine PGY2  4/16/2019 7:55 AM     Critical Care Attending Physician Addendum:     I have personally seen and examined Piper Starks with the resident and the key elements of all parts of the encounter were performed by me. Patient was reassessed on more than one occasion, when required. I reviewed the interval history, interpreted all available radiographic, laboratory and physiologic data at the time of service. The orders were discussed and the medication list was reviewed with the resident and is up to date. I agree with the problem list, assessment and plan as documented by resident with following amendments.     Patient has history of Ascending aortic aneurysm replacement, aortic valve replacement, mitral valve repair and atrial fibrillation on chronic anticoagulation with Coumadin and dementia found down in his bathtub for unknown duration. He was noted to be confused found to have multiple right-sided rib fractures with small hemo-/pneumothorax. Evaluated by trauma team, no intervention planned. Patient had acute kidney injury likely related to rhabdomyolysis and lactic acidosis. He required pressors overnight, mental status seems to have improved. Continue IV hydration and supportive treatment. Cardiology consulted. Monitor input/output, CBC reviewed, on empiric vancomycin and Zosyn.     Critical care time of greater than 30 minutes was spent (excluding procedures), in coordination of care during bedside multidisciplinary rounds and discussion of patient care in detail, and recommendations of the team were adopted in the plan. Additionally, the necessity of all invasive devices was reviewed.   Rogelio Mcguire M.D.   Pulmonary and Critical Care Medicine

## 2019-04-16 NOTE — PROGRESS NOTES
Physical Therapy  DATE: 2019    NAME: Marcella Guillory  MRN: [id number]   : 10/10/1924    Patient not seen this date for Physical Therapy due to:  [] Blood transfusion in progress  [] Hemodialysis  []  Patient Declined  [] Spine Precautions   [] Strict Bedrest  [] Surgery/ Procedure  [] Testing      [x] Other--skin care RN assessing pt; ck back later as time allows. [] PT being discontinued at this time. Patient independent. No further needs. [] PT being discontinued at this time as the patient has been transferred to palliative care. No further needs.     Melva Street, PT

## 2019-04-17 LAB
ANION GAP SERPL CALCULATED.3IONS-SCNC: 9 MMOL/L (ref 9–17)
BUN BLDV-MCNC: 32 MG/DL (ref 8–23)
BUN/CREAT BLD: ABNORMAL (ref 9–20)
CALCIUM SERPL-MCNC: 8.2 MG/DL (ref 8.6–10.4)
CHLORIDE BLD-SCNC: 114 MMOL/L (ref 98–107)
CO2: 24 MMOL/L (ref 20–31)
CREAT SERPL-MCNC: 0.8 MG/DL (ref 0.7–1.2)
GFR AFRICAN AMERICAN: >60 ML/MIN
GFR NON-AFRICAN AMERICAN: >60 ML/MIN
GFR SERPL CREATININE-BSD FRML MDRD: ABNORMAL ML/MIN/{1.73_M2}
GFR SERPL CREATININE-BSD FRML MDRD: ABNORMAL ML/MIN/{1.73_M2}
GLUCOSE BLD-MCNC: 111 MG/DL (ref 70–99)
GLUCOSE BLD-MCNC: 118 MG/DL (ref 75–110)
GLUCOSE BLD-MCNC: 123 MG/DL (ref 75–110)
GLUCOSE BLD-MCNC: 127 MG/DL (ref 75–110)
GLUCOSE BLD-MCNC: 131 MG/DL (ref 75–110)
HCT VFR BLD CALC: 34.9 % (ref 40.7–50.3)
HEMOGLOBIN: 10.8 G/DL (ref 13–17)
INR BLD: 3.1
LACTIC ACID, WHOLE BLOOD: 1.5 MMOL/L (ref 0.7–2.1)
MCH RBC QN AUTO: 30.1 PG (ref 25.2–33.5)
MCHC RBC AUTO-ENTMCNC: 30.9 G/DL (ref 28.4–34.8)
MCV RBC AUTO: 97.2 FL (ref 82.6–102.9)
MYOGLOBIN: 397 NG/ML (ref 28–72)
MYOGLOBIN: 536 NG/ML (ref 28–72)
NRBC AUTOMATED: 0 PER 100 WBC
PDW BLD-RTO: 16.2 % (ref 11.8–14.4)
PLATELET # BLD: 164 K/UL (ref 138–453)
PMV BLD AUTO: 11.4 FL (ref 8.1–13.5)
POTASSIUM SERPL-SCNC: 3.8 MMOL/L (ref 3.7–5.3)
PROTHROMBIN TIME: 30 SEC (ref 9–12)
RBC # BLD: 3.59 M/UL (ref 4.21–5.77)
SODIUM BLD-SCNC: 147 MMOL/L (ref 135–144)
TOTAL CK: 532 U/L (ref 39–308)
TOTAL CK: 647 U/L (ref 39–308)
WBC # BLD: 9.1 K/UL (ref 3.5–11.3)

## 2019-04-17 PROCEDURE — 85610 PROTHROMBIN TIME: CPT

## 2019-04-17 PROCEDURE — 83605 ASSAY OF LACTIC ACID: CPT

## 2019-04-17 PROCEDURE — 2700000000 HC OXYGEN THERAPY PER DAY

## 2019-04-17 PROCEDURE — 6370000000 HC RX 637 (ALT 250 FOR IP): Performed by: STUDENT IN AN ORGANIZED HEALTH CARE EDUCATION/TRAINING PROGRAM

## 2019-04-17 PROCEDURE — 85027 COMPLETE CBC AUTOMATED: CPT

## 2019-04-17 PROCEDURE — 83874 ASSAY OF MYOGLOBIN: CPT

## 2019-04-17 PROCEDURE — 36415 COLL VENOUS BLD VENIPUNCTURE: CPT

## 2019-04-17 PROCEDURE — 2580000003 HC RX 258: Performed by: STUDENT IN AN ORGANIZED HEALTH CARE EDUCATION/TRAINING PROGRAM

## 2019-04-17 PROCEDURE — 82947 ASSAY GLUCOSE BLOOD QUANT: CPT

## 2019-04-17 PROCEDURE — 82550 ASSAY OF CK (CPK): CPT

## 2019-04-17 PROCEDURE — 99211 OFF/OP EST MAY X REQ PHY/QHP: CPT

## 2019-04-17 PROCEDURE — 80048 BASIC METABOLIC PNL TOTAL CA: CPT

## 2019-04-17 PROCEDURE — 1200000000 HC SEMI PRIVATE

## 2019-04-17 PROCEDURE — 99291 CRITICAL CARE FIRST HOUR: CPT | Performed by: INTERNAL MEDICINE

## 2019-04-17 PROCEDURE — 94762 N-INVAS EAR/PLS OXIMTRY CONT: CPT

## 2019-04-17 RX ORDER — LATANOPROST 50 UG/ML
1 SOLUTION/ DROPS OPHTHALMIC NIGHTLY
Status: DISCONTINUED | OUTPATIENT
Start: 2019-04-17 | End: 2019-04-18 | Stop reason: HOSPADM

## 2019-04-17 RX ORDER — LATANOPROST 50 UG/ML
1 SOLUTION/ DROPS OPHTHALMIC NIGHTLY
COMMUNITY

## 2019-04-17 RX ADMIN — MIDODRINE HYDROCHLORIDE 5 MG: 5 TABLET ORAL at 19:44

## 2019-04-17 RX ADMIN — MIDODRINE HYDROCHLORIDE 5 MG: 5 TABLET ORAL at 16:24

## 2019-04-17 RX ADMIN — DONEPEZIL HYDROCHLORIDE 10 MG: 10 TABLET, FILM COATED ORAL at 19:44

## 2019-04-17 RX ADMIN — LATANOPROST 1 DROP: 50 SOLUTION OPHTHALMIC at 19:45

## 2019-04-17 RX ADMIN — VITAMIN D, TAB 1000IU (100/BT) 2000 UNITS: 25 TAB at 08:24

## 2019-04-17 RX ADMIN — MIDODRINE HYDROCHLORIDE 5 MG: 5 TABLET ORAL at 08:25

## 2019-04-17 RX ADMIN — Medication 10 ML: at 08:26

## 2019-04-17 RX ADMIN — Medication 10 ML: at 19:45

## 2019-04-17 RX ADMIN — DEXTROSE AND SODIUM CHLORIDE: 5; 450 INJECTION, SOLUTION INTRAVENOUS at 05:45

## 2019-04-17 RX ADMIN — SIMVASTATIN 10 MG: 10 TABLET, FILM COATED ORAL at 19:44

## 2019-04-17 RX ADMIN — METOPROLOL TARTRATE 12.5 MG: 25 TABLET ORAL at 13:31

## 2019-04-17 RX ADMIN — METOPROLOL TARTRATE 12.5 MG: 25 TABLET ORAL at 19:44

## 2019-04-17 ASSESSMENT — PAIN SCALES - GENERAL
PAINLEVEL_OUTOF10: 0

## 2019-04-17 NOTE — CONSULTS
Delano Brewer      CC/Reason for consult:  Right elbow pain, concern for fracture    HPI:      The patient is a 80 y.o. male presenting to [place] [address] ED after being found down in the bathtub after he sustained a fall. Patient states he is not sure how he fell but states he was down for a few days until his daughter found him. Patient began to complain of right elbow pain and radiographs were concerning for possible effusion and occult fracture. Patient states he is not having much elbow pain today, per nursing her has been using the arm and elbow to feed himself. There are some wounds to the posterior elbow from the fall and laying on that side. Patient denies numbness or tingling to the affected hand. Past Medical History:    No past medical history on file. Past Surgical History:    No past surgical history on file. Medications Prior to Admission:   Prior to Admission medications    Medication Sig Start Date End Date Taking?  Authorizing Provider   latanoprost (XALATAN) 0.005 % ophthalmic solution Place 1 drop into both eyes nightly   Yes Historical Provider, MD   aspirin 81 MG EC tablet Take 81 mg by mouth every evening   Yes Historical Provider, MD   digoxin (LANOXIN) 125 MCG tablet Take 125 mcg by mouth daily   Yes Historical Provider, MD   diltiazem (CARDIZEM 12 HR) 120 MG extended release capsule Take 120 mg by mouth daily   Yes Historical Provider, MD   donepezil (ARICEPT) 10 MG tablet Take 10 mg by mouth nightly   Yes Historical Provider, MD   furosemide (LASIX) 40 MG tablet Take 40 mg by mouth 2 times daily   Yes Historical Provider, MD   lovastatin (MEVACOR) 20 MG tablet Take 20 mg by mouth nightly   Yes Historical Provider, MD   Omega-3 Fatty Acids (FISH OIL) 1200 MG CAPS Take 1 capsule by mouth 2 times daily   Yes Historical Provider, MD   metoprolol tartrate (LOPRESSOR) 25 MG tablet Take 12.5 mg by mouth 2 times daily   Yes Historical Provider, MD   midodrine (PROAMATINE) 5 MG tablet Take 5 mg by mouth 3 times daily   Yes Historical Provider, MD   Cholecalciferol (VITAMIN D3) 1000 units CAPS Take 2 capsules by mouth daily   Yes Historical Provider, MD   warfarin (COUMADIN) 5 MG tablet Take 7.5 mg by mouth Take 7.5 mg on tues, thurs and Saturday. Take 5 mg on mon, wed, fri, sunday   Yes Historical Provider, MD     Allergies:    Patient has no allergy information on record. Social History:   Social History     Socioeconomic History    Marital status: Single     Spouse name: Not on file    Number of children: Not on file    Years of education: Not on file    Highest education level: Not on file   Occupational History    Not on file   Social Needs    Financial resource strain: Not on file    Food insecurity:     Worry: Not on file     Inability: Not on file    Transportation needs:     Medical: Not on file     Non-medical: Not on file   Tobacco Use    Smoking status: Never Smoker    Smokeless tobacco: Never Used   Substance and Sexual Activity    Alcohol use: Not on file    Drug use: Not on file    Sexual activity: Not on file   Lifestyle    Physical activity:     Days per week: Not on file     Minutes per session: Not on file    Stress: Not on file   Relationships    Social connections:     Talks on phone: Not on file     Gets together: Not on file     Attends [employer] service: Not on file     Active member of club or organization: Not on file     Attends meetings of clubs or organizations: Not on file     Relationship status: Not on file    Intimate partner violence:     Fear of current or ex partner: Not on file     Emotionally abused: Not on file     Physically abused: Not on file     Forced sexual activity: Not on file   Other Topics Concern    Not on file   Social History Narrative    Not on file     Family History:  No family history on file. REVIEW OF SYSTEMS:   Constitutional: Negative for fever and chills. HENT: Negative for congestion.

## 2019-04-17 NOTE — FLOWSHEET NOTE
Pt prefers to stay sitting up in the recliner. Waffle cushion in place. Repositioned q hour. Pt stood with help of 2 assists to assess skin. No new redness noted. Will continue to monitor.

## 2019-04-17 NOTE — PROGRESS NOTES
Progress Note    Patient Name:  Swathi Maldonado    :  10/10/1924  2019 1:14 PM      SUBJECTIVE       Mr. Steve Briceño  has no chest pain, shortness of breath, palpitations, nausea or vomiting      OBJECTIVE     Vital signs:    /69   Pulse 84   Temp 97.9 °F (36.6 °C) (Oral)   Resp 15   Ht 5' 7\" (1.702 m)   Wt 153 lb 14.1 oz (69.8 kg)   SpO2 100%   BMI 24.10 kg/m²  3 L/min  .tro    Admit Weight:  220 lb 7.4 oz (100 kg)    Last 3 weights: Wt Readings from Last 3 Encounters:   19 153 lb 14.1 oz (69.8 kg)       BMI: Body mass index is 24.1 kg/m². Input/Output:       Intake/Output Summary (Last 24 hours) at 2019 1314  Last data filed at 2019 1113  Gross per 24 hour   Intake 3251 ml   Output 225 ml   Net 3026 ml       Date 19 0000 - 19 2359   Shift 9974-0639 0824-8605 1265-0884 24 Hour Total   INTAKE   P.O.(mL/kg/hr) 240(0.4) 240  480   I. V.(mL/kg) 912(13.1) 693(9.9)  [id number])   Shift Total(mL/kg) 5409(37.7) 149(56.5)  2215(94.2)   OUTPUT   Shift Total(mL/kg)       Weight (kg) 69.8 69.8 69.8 69.8     Exam:     General appearance: awake and alert moves all ext   Lungs: no rhonchi, no wheezes, no rales  Heart: S1 and S2 no murmur  Abdomen: positive bowel sounds, no bruits, no masses  Extremities: warm and dry, no cyanosis, no clubbing        Laboratory Studies:     CBC:   Recent Labs     04/15/19  1221 04/15/19  2038 19  0406 19  0447   WBC 10.8  --  8.9 9.1   HGB 14.0 11.4* 11.1* 10.8*   HCT 44.3 35.8* 35.9* 34.9*   MCV 95.3  --  96.8 97.2     --  167 164     BMP:   Recent Labs     04/15/19  1925 04/15/19  2244 19  0406 19  0447   *  --  145* 147*   K 4.1  --  3.9 3.8     --  111* 114*   CO2 24  --  21 24   PHOS  --  4.3  --   --    BUN 55*  --  46* 32*   CREATININE 1.06  --  1.07 0.80     PT/INR:   Recent Labs     04/15/19  1221 19  0406 19  0713   PROTIME 24.5* 35.6* 30.0*   INR 2.5 3.7 3.1     APTT:   Recent Labs 04/15/19  1221   APTT 28.3     MAG:   Recent Labs     04/15/19  2244   MG 2.5     D Dimer: No results for input(s): DDIMER in the last 72 hours. Troponin  Invalid input(s): TROP      troponins  BNP No results for input(s): BNP in the last 72 hours. Recent Labs     04/15/19  1307   PROBNP 2,413*         Pulse Ox: SpO2  Av.2 %  Min: 92 %  Max: 100 %  Supplemental O2: O2 Flow Rate (L/min): 3 L/min     Current Meds:    metoprolol tartrate  12.5 mg Oral BID    sodium chloride flush  10 mL Intravenous 2 times per day    vitamin D  2,000 Units Oral Daily    donepezil  10 mg Oral Nightly    simvastatin  10 mg Oral Nightly    midodrine  5 mg Oral TID    sodium chloride flush  10 mL Intravenous 2 times per day    insulin lispro  0-12 Units Subcutaneous TID WC    insulin lispro  0-6 Units Subcutaneous Nightly    lidocaine  2 patch Transdermal Daily    sodium chloride  500 mL Intravenous Once     Continuous Infusions:    diltiazem (CARDIZEM) 125 mg in dextrose 5% 125 mL infusion Stopped (04/15/19 2100)    dextrose              ASSESSMENT     Active Problems:    Rhabdomyolysis  Resolved Problems:    * No resolved hospital problems.  *      PLAN     Anticipated no additional cardiac testing at this point  Anticipated restarting cardiac home meds when able, including anticoagulation  Significant post repair MR noted by echo          Electronically signed by Leslie Salazar MD on 2019 at 1:14 PM

## 2019-04-17 NOTE — FLOWSHEET NOTE
[employer] message sent to Dr. Piter Potts and Dr. Pedro Luis Beckham regarding consult and that pt needs to be seen.

## 2019-04-17 NOTE — PROGRESS NOTES
Nutrition Assessment    Type and Reason for Visit: Reassess    Nutrition Recommendations: Continue Pureed diet with thin liquids, Continue Ensure ONS-provide 2 supplements per tray. Will continue to monitor tolerance to diet and adequacy of intake. Nutrition Assessment: Pt nutritionally compromised as evidenced by deep tissue injuries and swallowing difficulty. Pt sleeping in chair. Daughter in room and reports pt is tolerating pureed diet well. Noted pt ate 1-25% of meals yesterday, but % of breakfast +Ensure this morning. RN/Daughter requesting additional Ensure shakes for pt. Daughter reports pts intake is variable at home- states some days pt eats all of his meals and some days only one or two. Pt does like to snack on sweets throughout the day. Daughter reports she believes weight has been fairly stable - has not noticed any recent weight loss. Reports usual wt is 172 lbs. Malnutrition Assessment:  · Malnutrition Status: Meets the criteria for moderate malnutrition  · Context: Acute illness or injury  · Findings of the 6 clinical characteristics of malnutrition (Minimum of 2 out of 6 clinical characteristics is required to make the diagnosis of moderate or severe Protein Calorie Malnutrition based on AND/ASPEN Guidelines):  1. Energy Intake-Less than or equal to 75% of estimated energy requirement, Greater than or equal to 1 month    2. Weight Loss-Unable to assess(unable to accurately assess-possibly up to 10% loss )  3. Fat Loss-Unable to assess    4. Muscle Loss-Unable to assess    5. Fluid Accumulation-mild-moderate, Extremities, Generalized  6.  Strength-Not measured    Nutrition Risk Level:  Moderate    Nutrient Needs:  · Estimated Daily Total Kcal: 2122-4687 kcal/day   · Estimated Daily Protein (g):  g pro/day     Nutrition Diagnosis:   · Problem: Increased nutrient needs   · Etiology: related to healing   Signs and symptoms:  as evidenced by deep tissue injuries     Objective Information:  · Wound Type: Deep Tissue Injury(multiple)  · Current Nutrition Therapies:  · Oral Diet Orders: Dysphagia 1 (Pureed)   · Oral Diet intake: %   · Oral Nutrition Supplement (ONS) Orders: Standard High Calorie Oral Supplement  · ONS intake: % this morning  · Anthropometric Measures:  · Ht: 5' 7\" (170.2 cm)   · Current Body Wt: 153 lb 14.1 oz (69.8 kg)  · Admission Body Wt: 168 lb 10.4 oz (76.5 kg)  · Usual Body Wt: approx 172 lb (78 kg) per daughter  · % Weight Change: up to 10% loss, ?duration  · Ideal Body Wt: 148 lb (67.1 kg), % Ideal Body 104%  · BMI Classification: BMI 18.5 - 24.9 Normal Weight    Nutrition Interventions:   Continue current diet, Continue current ONS-provide 2 supplements per tray.   Continued Inpatient Monitoring, Education Not Indicated    Nutrition Evaluation:   · Evaluation: Progressing toward goals   · Goals: meet % of estimated nutrition needs     · Monitoring: Meal Intake, Supplement Intake, Diet Tolerance, Chewing/Swallowing, Weight, Pertinent Labs      Electronically signed by Darius Valdovinos RD, LD on 4/17/19 at 12:13 PM    Contact Number: [phone]

## 2019-04-17 NOTE — PROGRESS NOTES
chloride 10 mEq PRN   magnesium sulfate 1 g PRN   glucose 15 g PRN   dextrose 12.5 g PRN   glucagon (rDNA) 1 mg PRN   dextrose 100 mL/hr PRN   fentanNYL 25 mcg Q2H PRN   sodium phosphate IVPB 0.16 mmol/kg PRN   Or     sodium phosphate IVPB 0.32 mmol/kg PRN       SUPPORT DEVICES: [] Ventilator [] BIPAP  [x] Nasal Cannula [] Room Air    VENT SETTINGS (Comprehensive) (if applicable):        Additional Respiratory  Assessments  Pulse: 91  Resp: 21  SpO2: 100 %  Oral Care: Mouth swabbed, Mouth moisturizer    ABGs:     Lab Results   Component Value Date    FIO2 NOT REPORTED 04/15/2019         DATA:  Complete Blood Count: Recent Labs     04/15/19  1221 04/15/19  2038 04/16/19  0406 04/17/19  0447   WBC 10.8  --  8.9 9.1   RBC 4.65  --  3.71* 3.59*   HGB 14.0 11.4* 11.1* 10.8*   HCT 44.3 35.8* 35.9* 34.9*   MCV 95.3  --  96.8 97.2   MCH 30.1  --  29.9 30.1   MCHC 31.6  --  30.9 30.9   RDW 15.9*  --  15.9* 16.2*     --  167 164   MPV 11.4  --  11.4 11.4        Last 3 Blood Glucose:   Recent Labs     04/15/19  1221 04/15/19  1925 04/16/19  0406 04/17/19  0447   GLUCOSE 121* 105* 95 111*        PT/INR:    Lab Results   Component Value Date    PROTIME 35.6 04/16/2019    INR 3.7 04/16/2019     PTT:    Lab Results   Component Value Date    APTT 28.3 04/15/2019       Comprehensive Metabolic Profile:   Recent Labs     04/15/19  1925 04/16/19  0406 04/17/19  0447   * 145* 147*   K 4.1 3.9 3.8    111* 114*   CO2 24 21 24   BUN 55* 46* 32*   CREATININE 1.06 1.07 0.80   GLUCOSE 105* 95 111*   CALCIUM 8.7 8.2* 8.2*      Magnesium:   Lab Results   Component Value Date    MG 2.5 04/15/2019     Phosphorus:   Lab Results   Component Value Date    PHOS 4.3 04/15/2019     Ionized Calcium: No results found for: JANINA     Urinalysis: Lab Results   Component Value Date    NITRU NEGATIVE 04/15/2019    COLORU YELLOW 04/15/2019    PHUR 5.0 04/15/2019    WBCUA 2 TO 5 04/15/2019    RBCUA 0 TO 2 04/15/2019    MUCUS 2+ 04/15/2019 TRICHOMONAS NOT REPORTED 04/15/2019    YEAST NOT REPORTED 04/15/2019    BACTERIA FEW 04/15/2019    SPECGRAV 1.038 04/15/2019    LEUKOCYTESUR NEGATIVE 04/15/2019    UROBILINOGEN Normal 04/15/2019    BILIRUBINUR NEGATIVE 04/15/2019    GLUCOSEU NEGATIVE 04/15/2019    KETUA TRACE 04/15/2019    AMORPHOUS NOT REPORTED 04/15/2019       HgBA1c:  No results found for: LABA1C  TSH:  No results found for: TSH  Lactic Acid:   Lab Results   Component Value Date    LACTA NOT REPORTED 04/16/2019      Troponin: No results for input(s): TROPONINI in the last 72 hours. ASSESSMENT:     Patient Active Problem List    Diagnosis Date Noted    Rhabdomyolysis 04/15/2019          PLAN:     WEAN PER PROTOCOL:  [] No   [] Yes  [x] N/A    ICU PROPHYLAXIS:  Stress ulcer:  [] PPI Agent  [] [id number] [] Sucralfate  [x] Other:  VTE:   [] Enoxaparin  [] Unfract. Heparin Subcut  [x] EPC Cuffs    NUTRITION:  [] NPO [] Tube Feeding (Specify: ) [] TPN  [x] PO (dysphagia diet)    HOME MEDS RECONCILED: [] No  [x] Yes    CONSULTATION NEEDED:  [] No  [x] Yes    FAMILY UPDATED:    [] No  [] Yes    TRANSFER OUT OF ICU:   [] No  [x] Yes      NEUROLOGIC: AMS, LKW was Friday AM. CT head performed in ED negative. Alert and oriented to name and location. No significant changes in mental status.  -Neuro checks per protocol     CARDIOVASCULAR: Hx of A Fib. HR in 130s in ER, started on cardizem drip which has been discontinued. Hx of valve replacement. Currently on dig by patient history, but level undetectable in ED. Leonardo Hamman elevated troponin, follows with Dr. Stephanie Sanchez. On Coumadin, INR 3.7 yesterday. ProBNP elevated, suggestive of ?CHF. -Cardizem drip for rate control of A Fib, wean as tolerated to keep HR <110 and BP WNL  -Cards following, no further workup recommended  -Check INR  Today, restart coumadin today if subtheraputic      PULMONARY: On room air at baseline. Right sided rib fxs 6-9 in multiple places.  No clinical flail chest. ?small hemo/pneumothorax on CT. Currently requiring 2L NC.   -Incentive spirometry  -Pain control  -Repeat CXR in AM  -O2 to maintain sats >92%     RENAL/FLUID/ELECTROLYTE: Creatinine on admission is 1.23. Elevated CK and myoglobin- initial lab draw suggestive of rhabdo, but repeat is  and myoglobin. Based on lab review, believe initial labs were in error. VALARIE has resolved, CK and myoglobin have downtrended appropriately after fluid resuscitation.  -Strict Is/Os  -DC ck and myoglobin checks  -Daily BMP  -Wean IVF as tolerating PO     GI/NUTRITION:  NUTRITION:  -Dysphagia diet      ID:  Afebrile on admission, elevated lactic acid. Septic workup obtained and negative. Lactic acidosis has resolved with fluids, thus unlikely suggestive of infectious process. -Monitor fever curve  -Monitor WBC       HEMATOLOGIC: Coumadin, INR at 2.5. Hgb stable but trending downwards  -Monitor CBC  -Daily INR  -Consider restarting AC if INR subtherapeutic     ENDOCRINE:   PROBLEMS:  PLAN:   - monitor blood glucose  - insulin therapy -  Start ISS      OTHER: Falls. CT head, C spine, thoracic lumbar.  Right elbow xray with joint effusion and concern for occult fx  -Trauma consulted, awaiting clearance of spine from trauma team  -Ortho c/s, follow up recommendations        PROPHYLAXIS:                 VTE: SCDs     DISPOSITION:              Ok to transfer out of ICU today            Tona Mayo MD  Internal Medicine PGY2  4/17/2019 6:51 AM

## 2019-04-17 NOTE — PROGRESS NOTES
[facility]  Nurse  Follow up Note       NAME:  Baron Rodriguez  MEDICAL RECORD NUMBER:  [id number]  AGE: 80 y.o. GENDER: male  : 10/10/1924  TODAY'S DATE:  2019    Subjective   Reason for [id number] Nurse Evaluation and Assessment:   Visited patient sitting in bedside chair. Assessment   Steven Risk Score: Steven Scale Score: 14    Patient Active Problem List   Diagnosis Code    Rhabdomyolysis M62.82       Measurements:  Wound 04/15/19 Elbow Right purple, black suspected deep tissue injury (Active)   Wound Image   2019  9:30 AM   Wound Deep tissue/Injury 2019  9:00 AM   Dressing Status Changed 2019  9:00 AM   Dressing Changed Changed/New 2019  9:00 AM   Dressing/Treatment Wound gel 2019  9:00 AM   Wound Cleansed Rinsed/Irrigated with saline 2019  9:30 AM   Wound Width (cm) 4 cm 2019  9:30 AM   Wound Depth (cm) 3 cm 2019  9:30 AM   Wound Assessment Purple;Tan;Fibrin 2019  9:00 AM   Drainage Amount Scant 2019  9:00 AM   Drainage Description Serosanguinous 2019  9:00 AM   Odor None 2019  8:00 AM   Number of days: 1       Wound 04/15/19 Buttocks Right; Inner purple, suspected deep tissue injury (Active)   Wound Image   2019  9:30 AM   Wound Deep tissue/Injury 2019  4:00 AM   Dressing Changed Changed/New 2019  4:00 AM   Dressing/Treatment Barrier Film;Open to air 2019  8:00 AM   Dressing Change Due 19  8:00 AM   Wound Length (cm) 3 cm 2019  9:30 AM   Wound Width (cm) 3.5 cm 2019  9:30 AM   Wound Surface Area (cm^2) 10.5 cm^2 2019  9:30 AM   Wound Assessment Intact; Purple 2019  8:00 AM   Drainage Amount None 2019  8:00 AM   Odor None 2019  8:00 AM   Eri-wound Assessment Purple;Red;Fragile;Burgundy;Edema 2019  8:00 AM   Number of days: 1       Wound 04/15/19 Rib Cage purple suspected deep tissue injury  (Active)   Wound Image   2019  9:30 AM   Wound Deep tissue/Injury Dressing/Treatment Barrier Film;Open to air 4/17/2019  8:00 AM   Dressing Change Due 04/17/19 4/17/2019  8:00 AM   Wound Length (cm) 7.5 cm 4/16/2019  9:30 AM   Wound Width (cm) 4.5 cm 4/16/2019  9:30 AM   Wound Surface Area (cm^2) 33.75 cm^2 4/16/2019  9:30 AM   Wound Assessment Intact; Purple;Maroon;Red 4/17/2019  8:00 AM   Drainage Amount None 4/17/2019  8:00 AM   Odor None 4/17/2019  8:00 AM   Number of days: 1       Wound 04/15/19 Knee Right;Lateral red, purple, brown abrasion  (Active)   Wound Image   4/16/2019  9:30 AM   Wound Pressure Unstageable 4/17/2019  9:00 AM   Dressing Status Clean;Dry; Intact 4/17/2019  8:00 AM   Dressing Changed Changed/New 4/17/2019  9:00 AM   Dressing/Treatment Wound gel;Tegaderm 4/17/2019  9:00 AM   Wound Cleansed Rinsed/Irrigated with saline 4/17/2019  9:00 AM   Dressing Change Due 04/18/19 4/17/2019  9:00 AM   Wound Length (cm) 1.4 cm 4/16/2019  9:30 AM   Wound Width (cm) 1.9 cm 4/16/2019  9:30 AM   Wound Surface Area (cm^2) 2.66 cm^2 4/16/2019  9:30 AM   Wound Assessment Villanueva;Fibrin 4/17/2019  9:00 AM   Drainage Amount Scant 4/17/2019  9:00 AM   Drainage Description Serosanguinous 4/17/2019  9:00 AM   Odor None 4/17/2019  9:00 AM   Eri-wound Assessment Pink 4/17/2019  8:00 AM   Number of days: 1       Wound 04/15/19 Tibial Right;Lateral purple, brown, deep tissue injury  (Active)   Wound Image   4/16/2019  9:30 AM   Wound Deep tissue/Injury 4/17/2019  4:00 AM   Dressing Status Clean;Dry; Intact 4/16/2019  8:00 AM   Dressing/Treatment Barrier Film;Open to air 4/17/2019  8:00 AM   Wound Length (cm) 2 cm 4/16/2019  9:30 AM   Wound Width (cm) 1 cm 4/16/2019  9:30 AM   Wound Surface Area (cm^2) 2 cm^2 4/16/2019  9:30 AM   Wound Assessment Purple; Intact 4/17/2019  8:00 AM   Drainage Amount None 4/17/2019  8:00 AM   Odor None 4/17/2019  8:00 AM   Number of days: 1       Wound 04/15/19 Arm Right; Lower bruising, swollen, red, (Active)   Wound Traumatic 4/15/2019  8:00 PM Dressing/Treatment Open to air 4/17/2019  8:00 AM   Wound Assessment Red; Swelling;Edema;Purple;Painful;Maroon 4/17/2019  8:00 AM   Drainage Amount None 4/17/2019  8:00 AM   Odor None 4/17/2019  8:00 AM   Number of days: 1       Wound 04/16/19 Buttocks Right (Active)   Wound Image   4/16/2019  9:30 AM   Wound Deep tissue/Injury 4/16/2019 12:00 PM   Dressing Changed Changed/New 4/17/2019  8:00 AM   Dressing/Treatment Barrier Film;Open to air 4/17/2019  8:00 AM   Dressing Change Due 04/17/19 4/17/2019  8:00 AM   Wound Length (cm) 12 cm 4/16/2019  9:30 AM   Wound Width (cm) 6 cm 4/16/2019  9:30 AM   Wound Surface Area (cm^2) 72 cm^2 4/16/2019  9:30 AM   Wound Assessment Purple;Maroon; Intact 4/17/2019  8:00 AM   Drainage Amount None 4/17/2019  8:00 AM   Odor None 4/17/2019  8:00 AM   Number of days: 1       Wound 04/16/19 Shoulder Left (Active)   Wound Deep tissue/Injury 4/17/2019  4:00 AM   Dressing/Treatment Foam 4/17/2019  8:00 AM   Wound Length (cm) 7 cm 4/16/2019  9:30 AM   Wound Width (cm) 8 cm 4/16/2019  9:30 AM   Wound Surface Area (cm^2) 56 cm^2 4/16/2019  9:30 AM   Wound Assessment Intact; Purple 4/17/2019  8:00 AM   Drainage Amount None 4/17/2019  8:00 AM   Odor None 4/17/2019  8:00 AM   Number of days: 1     Right elbow wound with tan fibrinous base today. Hydrogel wound dressing applied and secured with stockinet. Supported/elevated with pillow. Wound gel used to right lateral knee and toe wounds. Unable to assess other areas at this time. Brena Kranthi continued to DTPI with intact skin. Foam dressing to left posterior shoulder        Response to treatment:  Well tolerated by patient.        Plan   Plan of Care: Wound 04/16/19 Buttocks Right-Dressing/Treatment: Barrier Film, Open to air(SurePREP)  Wound 04/16/19 Shoulder Left-Dressing/Treatment: Foam(Mepilex)  Wound 04/15/19 Elbow Right purple, black suspected deep tissue injury-Dressing/Treatment: (P) Wound gel(Dermagel hydrogel dressing & stockinette)  Wound 04/15/19 Buttocks Right; Inner purple, suspected deep tissue injury-Dressing/Treatment: Barrier Film, Open to air(SurePREP )  Wound 04/15/19 Rib Cage purple suspected deep tissue injury -Dressing/Treatment: Barrier Film, Open to air(SurePREP)  Wound 04/15/19 Head Posterior red-Dressing/Treatment: Open to air  Wound 04/15/19  Right;Plantar third toe-Dressing/Treatment: (P) Wound gel, Dry Dressing  Wound 04/15/19 Hip Right red, swollen, hard -Dressing/Treatment: Barrier Film, Open to air(SurePREP)  Wound 04/15/19 Knee Right;Lateral red, purple, brown abrasion -Dressing/Treatment: (P) Wound gel, Tegaderm  Wound 04/15/19 Tibial Right;Lateral purple, brown, deep tissue injury -Dressing/Treatment: Barrier Film, Open to air(SurePREP)  Wound 04/15/19 Arm Right; Lower bruising, swollen, red,-Dressing/Treatment: Open to air    Specialty Bed Required : Yes   [x] Low Air Loss   [x] Pressure Redistribution   Waffle chair Chair cushion  [] Fluid Immersion  [] Bariatric  [] Total Pressure Relief  [] Other:

## 2019-04-17 NOTE — PROGRESS NOTES
Occupational Therapy   Occupational Therapy Initial Assessment  Date: 2019   Patient Name: Iris Moore  MRN: [id number]     : 10/10/1924    Date of Service: 2019    Discharge Recommendations:    Further therapy recommended at discharge. OT Equipment Recommendations  Equipment Needed: Yes  Mobility Devices: ADL Assistive Devices; Melvena Lob: Rolling  ADL Assistive Devices: Shower Chair with back;Grab Bars - shower;Sock-Aid Soft    Assessment   Performance deficits / Impairments: Decreased functional mobility ; Decreased endurance;Decreased strength;Decreased high-level IADLs;Decreased safe awareness;Decreased ADL status; Decreased balance;Decreased ROM  Assessment: Patient demonstrates decreased R UE ROM due to rib fractures, impacting patient's performance in self-care tasks and ability to complete functional transfers safely. Patient is limited by deconditioned status, impaired safety awareness with increased fear of falling, and need for safety education and AE demonstration to return to [facility]. Patient expected to need skilled OT services in acute setting and at d/c to increase endurance, standing tolerance, and overall safety awareness prior to returning home. Prognosis: Good  Decision Making: Medium Complexity  Patient Education: Patient educated on purpose of eval, OT POC, transfer safety, posture importance, and safety awareness with functional mobility   REQUIRES OT FOLLOW UP: Yes  Activity Tolerance  Activity Tolerance: Patient limited by fatigue  Activity Tolerance: ~5 minutes   Safety Devices  Safety Devices in place: Yes  Type of devices: Left in chair;Chair alarm in place;Call light within reach; All fall risk precautions in place;Nurse notified;Gait belt;Patient at risk for falls           Patient Diagnosis(es): The primary encounter diagnosis was Traumatic rhabdomyolysis, initial encounter ([facility] [address]  Diagnoses of Closed fracture of multiple ribs of right side, initial encounter, Balance  Sitting Balance: Supervision  Standing Balance: Minimal assistance  Standing Balance  Time: ~1 minute  Activity: Standing static on bed to prepare for transfer to the chair  Comment: Patient with flexed posture   Functional Mobility  Functional - Mobility Device: No device  ADL  Feeding: Modified independent   Grooming: Contact guard assistance  UE Bathing: Minimal assistance  LE Bathing: Moderate assistance  UE Dressing: Minimal assistance  LE Dressing: Moderate assistance  Toileting: Minimal assistance  Additional Comments: Patient completed grooming task sitting EOB for ~10 minutes. Patient able to complete with support provided by pillows only with 0 LOB during sitting. Patient able to complete stand pivot transfer to recliner to sit up to eat breakfast.   Tone RUE  RUE Tone: Normotonic  Tone LUE  LUE Tone: Normotonic  Coordination  Movements Are Fluid And Coordinated: Yes     Bed mobility  Rolling to Right: Moderate assistance  Supine to Sit: Moderate assistance  Sit to Supine: Moderate assistance  Scooting: Moderate assistance  Comment: Patient needing Mod A to complete for B LE's  Transfers  Sit to stand: Moderate assistance  Stand to sit: Minimal assistance  Transfer Comments: Patient unsteady on feet with flexed posture, provided verbal cueing on neutral posture with poor response      Cognition  Overall Cognitive Status: Exceptions  Arousal/Alertness: Delayed responses to stimuli  Following Commands:  Follows one step commands with increased time        Sensation  Overall Sensation Status: WFL        LUE AROM : WFL  Left Hand AROM: WFL  RUE AROM : WFL  Right Hand AROM: WFL  LUE Strength  Gross LUE Strength: Exceptions to [facility]  L Hand Grasp: 4-/5  L Hand Release: 4-/5  RUE Strength  Gross RUE Strength: Exceptions to [facility]  R Hand Grasp: 3-/5  R Hand Release: 3-/5              Plan   Plan  Times per week: 4-5x/wk  Current Treatment Recommendations: Strengthening, Endurance Training, Patient/Caregiver Education & Training, Self-Care / ADL, Home Management Training, Safety Education & Training    AM-PAC Score        AM-PAC Inpatient Daily Activity Raw Score: 19  AM-PAC Inpatient ADL T-Scale Score : 40.22  ADL Inpatient CMS 0-100% Score: 42.8  ADL Inpatient CMS G-Code Modifier : CK    Goals  Short term goals  Time Frame for Short term goals: Patient will, by discharge  Short term goal 1: demonstrate ~10 minutes of dynamic standing tolerance with LRD with good safety awareness  Short term goal 2: demonstrate UB self-cares at Supervision  Short term goal 3: demonstrate LB self-cares at Supervision  Short term goal 4: demonstrate ~25 minutes of functional activity tolerance  Short term goal 5: demonstrate compensatory strategies with self-care tasks using L UE with good safety awareness       Therapy Time   Individual Concurrent Group Co-treatment   Time In 0814         Time Out 0850         Minutes 36         Timed Code Treatment Minutes: CHARLENE/ RAMAN Cole/L

## 2019-04-17 NOTE — PROGRESS NOTES
Physical Therapy  DATE: 2019    NAME: Ulises Unger  MRN: [id number]   : 10/10/1924    Patient not seen this date for Physical Therapy due to:  [] Blood transfusion in progress  [] Hemodialysis  [x]  Patient Declined ; Reports feeling weak and tired. Will check bal tomorrow  [] Spine Precautions   [] Strict Bedrest  [] Surgery/ Procedure  [] Testing      [] Other        [] PT being discontinued at this time. Patient independent. No further needs. [] PT being discontinued at this time as the patient has been transferred to palliative care. No further needs.     Zulay Campos, PTA

## 2019-04-17 NOTE — CARE COORDINATION
nHpredict Inpatient Visit - [facility] patient    nHpredict is a tool, to be used as a guide, and should not alter their currently established discharge plan. Current discharge plan: [address] completed with case management: yes    4/17 nH Predict in media & [facility] [address]. Recommendation is SNF which is same as DC plan. Copy of predict tool provided to CM for SNF packet. Per Accompanying BETHANY email: The outcome report is recommending SNF once d/ced from acute which aligns with current d/c plan.

## 2019-04-17 NOTE — PLAN OF CARE
Problem: Falls - Risk of:  Goal: Will remain free from falls  Description  Will remain free from falls  Outcome: Ongoing     Problem: Falls - Risk of:  Goal: Absence of physical injury  Description  Absence of physical injury  Outcome: Ongoing     Problem: Risk for Impaired Skin Integrity  Goal: Tissue integrity - skin and mucous membranes  Description  Structural intactness and normal physiological function of skin and  mucous membranes.   Outcome: Ongoing     Problem: Pain:  Goal: Pain level will decrease  Description  Pain level will decrease  Outcome: Ongoing     Problem: Pain:  Goal: Control of chronic pain  Description  Control of chronic pain  Outcome: Ongoing

## 2019-04-18 VITALS
HEART RATE: 83 BPM | TEMPERATURE: 97.7 F | BODY MASS INDEX: 24.15 KG/M2 | DIASTOLIC BLOOD PRESSURE: 50 MMHG | WEIGHT: 153.88 LBS | SYSTOLIC BLOOD PRESSURE: 92 MMHG | OXYGEN SATURATION: 100 % | HEIGHT: 67 IN | RESPIRATION RATE: 21 BRPM

## 2019-04-18 LAB
ANION GAP SERPL CALCULATED.3IONS-SCNC: 9 MMOL/L (ref 9–17)
BUN BLDV-MCNC: 34 MG/DL (ref 8–23)
BUN/CREAT BLD: ABNORMAL (ref 9–20)
CALCIUM SERPL-MCNC: 8 MG/DL (ref 8.6–10.4)
CHLORIDE BLD-SCNC: 109 MMOL/L (ref 98–107)
CO2: 24 MMOL/L (ref 20–31)
CREAT SERPL-MCNC: 0.72 MG/DL (ref 0.7–1.2)
GFR AFRICAN AMERICAN: >60 ML/MIN
GFR NON-AFRICAN AMERICAN: >60 ML/MIN
GFR SERPL CREATININE-BSD FRML MDRD: ABNORMAL ML/MIN/{1.73_M2}
GFR SERPL CREATININE-BSD FRML MDRD: ABNORMAL ML/MIN/{1.73_M2}
GLUCOSE BLD-MCNC: 102 MG/DL (ref 70–99)
GLUCOSE BLD-MCNC: 134 MG/DL (ref 75–110)
GLUCOSE BLD-MCNC: 75 MG/DL (ref 75–110)
HCT VFR BLD CALC: 32.3 % (ref 40.7–50.3)
HEMOGLOBIN: 9.6 G/DL (ref 13–17)
INR BLD: 1.7
MCH RBC QN AUTO: 29.6 PG (ref 25.2–33.5)
MCHC RBC AUTO-ENTMCNC: 29.7 G/DL (ref 28.4–34.8)
MCV RBC AUTO: 99.7 FL (ref 82.6–102.9)
NRBC AUTOMATED: 0 PER 100 WBC
PDW BLD-RTO: 16 % (ref 11.8–14.4)
PLATELET # BLD: 157 K/UL (ref 138–453)
PMV BLD AUTO: 10.6 FL (ref 8.1–13.5)
POTASSIUM SERPL-SCNC: 3.9 MMOL/L (ref 3.7–5.3)
PROTHROMBIN TIME: 16.9 SEC (ref 9–12)
RBC # BLD: 3.24 M/UL (ref 4.21–5.77)
SODIUM BLD-SCNC: 142 MMOL/L (ref 135–144)
WBC # BLD: 7.9 K/UL (ref 3.5–11.3)

## 2019-04-18 PROCEDURE — 76937 US GUIDE VASCULAR ACCESS: CPT

## 2019-04-18 PROCEDURE — 6370000000 HC RX 637 (ALT 250 FOR IP): Performed by: STUDENT IN AN ORGANIZED HEALTH CARE EDUCATION/TRAINING PROGRAM

## 2019-04-18 PROCEDURE — 97110 THERAPEUTIC EXERCISES: CPT

## 2019-04-18 PROCEDURE — 36415 COLL VENOUS BLD VENIPUNCTURE: CPT

## 2019-04-18 PROCEDURE — 97530 THERAPEUTIC ACTIVITIES: CPT

## 2019-04-18 PROCEDURE — 99239 HOSP IP/OBS DSCHRG MGMT >30: CPT | Performed by: INTERNAL MEDICINE

## 2019-04-18 PROCEDURE — 82947 ASSAY GLUCOSE BLOOD QUANT: CPT

## 2019-04-18 PROCEDURE — 2700000000 HC OXYGEN THERAPY PER DAY

## 2019-04-18 PROCEDURE — 85027 COMPLETE CBC AUTOMATED: CPT

## 2019-04-18 PROCEDURE — 2580000003 HC RX 258: Performed by: STUDENT IN AN ORGANIZED HEALTH CARE EDUCATION/TRAINING PROGRAM

## 2019-04-18 PROCEDURE — 92526 ORAL FUNCTION THERAPY: CPT

## 2019-04-18 PROCEDURE — 85610 PROTHROMBIN TIME: CPT

## 2019-04-18 PROCEDURE — 80048 BASIC METABOLIC PNL TOTAL CA: CPT

## 2019-04-18 PROCEDURE — 94762 N-INVAS EAR/PLS OXIMTRY CONT: CPT

## 2019-04-18 RX ORDER — WARFARIN SODIUM 7.5 MG/1
7.5 TABLET ORAL
Status: DISCONTINUED | OUTPATIENT
Start: 2019-04-18 | End: 2019-04-18 | Stop reason: HOSPADM

## 2019-04-18 RX ORDER — WARFARIN SODIUM 5 MG/1
5 TABLET ORAL
Status: DISCONTINUED | OUTPATIENT
Start: 2019-04-18 | End: 2019-04-18

## 2019-04-18 RX ADMIN — MIDODRINE HYDROCHLORIDE 5 MG: 5 TABLET ORAL at 07:38

## 2019-04-18 RX ADMIN — VITAMIN D, TAB 1000IU (100/BT) 2000 UNITS: 25 TAB at 07:38

## 2019-04-18 RX ADMIN — METOPROLOL TARTRATE 12.5 MG: 25 TABLET ORAL at 07:38

## 2019-04-18 RX ADMIN — MIDODRINE HYDROCHLORIDE 5 MG: 5 TABLET ORAL at 14:48

## 2019-04-18 RX ADMIN — Medication 10 ML: at 07:39

## 2019-04-18 ASSESSMENT — PAIN SCALES - GENERAL
PAINLEVEL_OUTOF10: 0
PAINLEVEL_OUTOF10: 3
PAINLEVEL_OUTOF10: 0
PAINLEVEL_OUTOF10: 0

## 2019-04-18 ASSESSMENT — PAIN DESCRIPTION - ONSET: ONSET: ON-GOING

## 2019-04-18 ASSESSMENT — PAIN DESCRIPTION - PAIN TYPE: TYPE: ACUTE PAIN

## 2019-04-18 ASSESSMENT — PAIN DESCRIPTION - LOCATION: LOCATION: SHOULDER

## 2019-04-18 ASSESSMENT — PAIN - FUNCTIONAL ASSESSMENT: PAIN_FUNCTIONAL_ASSESSMENT: PREVENTS OR INTERFERES SOME ACTIVE ACTIVITIES AND ADLS

## 2019-04-18 ASSESSMENT — PAIN DESCRIPTION - DESCRIPTORS: DESCRIPTORS: ACHING;DISCOMFORT

## 2019-04-18 ASSESSMENT — PAIN DESCRIPTION - PROGRESSION: CLINICAL_PROGRESSION: NOT CHANGED

## 2019-04-18 ASSESSMENT — PAIN DESCRIPTION - ORIENTATION: ORIENTATION: RIGHT

## 2019-04-18 ASSESSMENT — PAIN DESCRIPTION - FREQUENCY: FREQUENCY: INTERMITTENT

## 2019-04-18 NOTE — PROGRESS NOTES
Progress Note    Patient Name:  Wil Ortega    :  10/10/1924  2019 12:22 PM      SUBJECTIVE       Mr. Marquis Cano  has no chest pain, shortness of breath, palpitations, nausea or vomiting      OBJECTIVE     Vital signs:    /88   Pulse 77   Temp 97.9 °F (36.6 °C) (Oral)   Resp 19   Ht 5' 7\" (1.702 m)   Wt 153 lb 14.1 oz (69.8 kg)   SpO2 99%   BMI 24.10 kg/m²  2 L/min  .tro    Admit Weight:  220 lb 7.4 oz (100 kg)    Last 3 weights: Wt Readings from Last 3 Encounters:   19 153 lb 14.1 oz (69.8 kg)       BMI: Body mass index is 24.1 kg/m². Input/Output:       Intake/Output Summary (Last 24 hours) at 2019 1222  Last data filed at 2019 1030  Gross per 24 hour   Intake 1760 ml   Output 1000 ml   Net 760 ml       Date 19 0000 - 19 2359   Shift 2908-6452 3134-4289 9452-1147 24 Hour Total   INTAKE   P.O.(mL/kg/hr) 300(0.5) 720  1020   Shift Total(mL/kg) 300(4.3) 720(10.3)  1020(14.6)   OUTPUT   Urine(mL/kg/hr) 150(0.3) 200  350   Shift Total(mL/kg) 150(2.1) 200(2.9)  350(5)   Weight (kg) 69.8 69.8 69.8 69.8     Exam:     General appearance: awake and alert moves all ext   Lungs: no rhonchi, no wheezes, no rales  Heart: S1 and S2 no murmur  Abdomen: positive bowel sounds, no bruits, no masses  Extremities: warm and dry, no cyanosis, no clubbing        Laboratory Studies:     CBC:   Recent Labs     19  0406 19  0447 19  0437   WBC 8.9 9.1 7.9   HGB 11.1* 10.8* 9.6*   HCT 35.9* 34.9* 32.3*   MCV 96.8 97.2 99.7    164 157     BMP:   Recent Labs     04/15/19  2244 19  0406 19  0447 19  0437   NA  --  145* 147* 142   K  --  3.9 3.8 3.9   CL  --  111* 114* 109*   CO2  --  21 24 24   PHOS 4.3  --   --   --    BUN  --  46* 32* 34*   CREATININE  --  1.07 0.80 0.72     PT/INR:   Recent Labs     19  0406 19  0713 19  0719   PROTIME 35.6* 30.0* 16.9*   INR 3.7 3.1 1.7     APTT: No results for input(s):  APTT in the last 72 hours.  MAG:   Recent Labs     04/15/19  2244   MG 2.5     D Dimer: No results for input(s): DDIMER in the last 72 hours. Troponin  Invalid input(s): TROP      troponins  BNP No results for input(s): BNP in the last 72 hours. Recent Labs     04/15/19  1307   PROBNP 2,413*         Pulse Ox: SpO2  Av.7 %  Min: 98 %  Max: 100 %  Supplemental O2: O2 Flow Rate (L/min): 2 L/min     Current Meds:    warfarin  5 mg Oral Once    metoprolol tartrate  12.5 mg Oral BID    latanoprost  1 drop Both Eyes Nightly    sodium chloride flush  10 mL Intravenous 2 times per day    vitamin D  2,000 Units Oral Daily    donepezil  10 mg Oral Nightly    simvastatin  10 mg Oral Nightly    midodrine  5 mg Oral TID    insulin lispro  0-12 Units Subcutaneous TID WC    insulin lispro  0-6 Units Subcutaneous Nightly    lidocaine  2 patch Transdermal Daily    sodium chloride  500 mL Intravenous Once     Continuous Infusions:    dextrose              ASSESSMENT     Active Problems:    Rhabdomyolysis  Resolved Problems:    * No resolved hospital problems.  *      PLAN     Plan ECF on anticoagulation INR 2-3      Electronically signed by Tiffanie Matamoros MD on 2019 at 12:22 PM

## 2019-04-18 NOTE — PROGRESS NOTES
INTAKE   P.O.(mL/kg/hr) 300   300   Shift Total(mL/kg) 300(4.3)   300(4.3)   OUTPUT   Urine(mL/kg/hr) 150   150   Shift Total(mL/kg) 150(2.1)   150(2.1)   Weight (kg) 69.8 69.8 69.8 69.8     Wt Readings from Last 3 Encounters:   04/17/19 153 lb 14.1 oz (69.8 kg)     Body mass index is 24.1 kg/m².         PHYSICAL EXAM:  GEN:               Awake, alert, following commands,interactive this AM with exam   EYES:  pupils equal, round, and reactive to light, extra-ocular muscles intact  HEENT:           Normocepalic, without obvious abnormality  LUNGS:           no increased work of breathing  CV:                  regular rate and rhythm and harsh III/VI systolic murmur  ABDOMEN:     normal bowel sounds and soft     MSK:               Reduced ROM of RUE 2/2 pain, swelling and tenderness to right elbow and humerus  NEURO[de-identified]         Alert and oriented to name, not to location or time  SKIN:               Ecchymosis to right upper extremity, right torso, right side of abdomen/flank  EXTREMITIES:  No pedal or leg edema, no calf tenderness/swelling, no erythema, distal pulses intact              MEDICATIONS:  Scheduled Meds:   metoprolol tartrate  12.5 mg Oral BID    latanoprost  1 drop Both Eyes Nightly    sodium chloride flush  10 mL Intravenous 2 times per day    vitamin D  2,000 Units Oral Daily    donepezil  10 mg Oral Nightly    simvastatin  10 mg Oral Nightly    midodrine  5 mg Oral TID    insulin lispro  0-12 Units Subcutaneous TID WC    insulin lispro  0-6 Units Subcutaneous Nightly    lidocaine  2 patch Transdermal Daily    sodium chloride  500 mL Intravenous Once     Continuous Infusions:   dextrose       PRN Meds:     sodium chloride flush 10 mL PRN   magnesium hydroxide 30 mL Daily PRN   ondansetron 4 mg Q6H PRN   acetaminophen 650 mg Q4H PRN   potassium chloride 10 mEq PRN   magnesium sulfate 1 g PRN   glucose 15 g PRN   dextrose 12.5 g PRN   glucagon (rDNA) 1 mg PRN   dextrose 100 mL/hr PRN fentanNYL 25 mcg Q2H PRN   sodium phosphate IVPB 0.16 mmol/kg PRN   Or     sodium phosphate IVPB 0.32 mmol/kg PRN       SUPPORT DEVICES: [] Ventilator [] BIPAP  [] Nasal Cannula [x] Room Air    VENT SETTINGS (Comprehensive) (if applicable):        Additional Respiratory  Assessments  Pulse: 75  Resp: 20  SpO2: 98 %  Oral Care: Mouth swabbed, Mouth moisturizer    ABGs:     Lab Results   Component Value Date    FIO2 NOT REPORTED 04/15/2019         DATA:  Complete Blood Count:   Recent Labs     04/16/19  0406 04/17/19 0447 04/18/19  0437   WBC 8.9 9.1 7.9   RBC 3.71* 3.59* 3.24*   HGB 11.1* 10.8* 9.6*   HCT 35.9* 34.9* 32.3*   MCV 96.8 97.2 99.7   MCH 29.9 30.1 29.6   MCHC 30.9 30.9 29.7   RDW 15.9* 16.2* 16.0*    164 157   MPV 11.4 11.4 10.6        Last 3 Blood Glucose:   Recent Labs     04/15/19  1221 04/15/19  1925 04/16/19  0406 04/17/19  0447 04/18/19  0437   GLUCOSE 121* 105* 95 111* 102*        PT/INR:    Lab Results   Component Value Date    PROTIME 30.0 04/17/2019    INR 3.1 04/17/2019     PTT:    Lab Results   Component Value Date    APTT 28.3 04/15/2019       Comprehensive Metabolic Profile:   Recent Labs     04/16/19  0406 04/17/19 0447 04/18/19  0437   * 147* 142   K 3.9 3.8 3.9   * 114* 109*   CO2 21 24 24   BUN 46* 32* 34*   CREATININE 1.07 0.80 0.72   GLUCOSE 95 111* 102*   CALCIUM 8.2* 8.2* 8.0*      Magnesium:   Lab Results   Component Value Date    MG 2.5 04/15/2019     Phosphorus:   Lab Results   Component Value Date    PHOS 4.3 04/15/2019     Ionized Calcium: No results found for: CAION     Urinalysis:   Lab Results   Component Value Date    NITRU NEGATIVE 04/15/2019    COLORU YELLOW 04/15/2019    PHUR 5.0 04/15/2019    WBCUA 2 TO 5 04/15/2019    RBCUA 0 TO 2 04/15/2019    MUCUS 2+ 04/15/2019    TRICHOMONAS NOT REPORTED 04/15/2019    YEAST NOT REPORTED 04/15/2019    BACTERIA FEW 04/15/2019    SPECGRAV 1.038 04/15/2019    LEUKOCYTESUR NEGATIVE 04/15/2019    UROBILINOGEN Normal 04/15/2019    BILIRUBINUR NEGATIVE 04/15/2019    GLUCOSEU NEGATIVE 04/15/2019    KETUA TRACE 04/15/2019    AMORPHOUS NOT REPORTED 04/15/2019       HgBA1c:  No results found for: LABA1C  TSH:  No results found for: TSH  Lactic Acid:   Lab Results   Component Value Date    LACTA NOT REPORTED 04/16/2019      Troponin: No results for input(s): TROPONINI in the last 72 hours. ASSESSMENT:     Patient Active Problem List    Diagnosis Date Noted    Rhabdomyolysis 04/15/2019          PLAN:     WEAN PER PROTOCOL:  [] No   [] Yes  [x] N/A    ICU PROPHYLAXIS:  Stress ulcer:  [] PPI Agent  [] [id number] [] Sucralfate  [x] Other:  VTE:   [] Enoxaparin  [] Unfract. Heparin Subcut  [x] EPC Cuffs    NUTRITION:  [] NPO [] Tube Feeding (Specify: ) [] TPN  [x] PO (dysphagia diet)    HOME MEDS RECONCILED: [] No  [x] Yes    CONSULTATION NEEDED:  [] No  [x] Yes    FAMILY UPDATED:    [] No  [] Yes    TRANSFER OUT OF ICU:   [] No  [x] Yes      NEUROLOGIC:   Awakre, alert and oriented answering all the questions  Hx of dementia -continue donepezil     CARDIOVASCULAR:   Continue Lopressor 12.5 mg twice a day  In sinus rhythm   -Check INR  Today, restart coumadin today if subtheraputic     Surgery on board no additional cardiac testing at that this time.   Home meds are  digoxin 125, Cardizem 120 mg, Lopressor 12.5 mg   At Present heart rate controlled with Lopressor, will resume digoxin and Cardizem as needed   Continue Zocor 10 mg    PULMONARY:   On room air   -Incentive spirometry  -Pain control- fentanyl        RENAL/FLUID/ELECTROLYTE:     In 3.9  Urine output 1000 mL over last 20 every 4 hours    GI/NUTRITION:  NUTRITION:  -Dysphagia diet   Eating fine   Will add  Colace for bowel regimen    ID: Afebrile  WBC count normal       HEMATOLOGIC:   INR 3.1 yesterday  Follow up INR today  Depending on INR  Will resume Coumadin     ENDOCRINE:   PROBLEMS:  PLAN:   - monitor blood glucose- 102   - insulin therapy -  on sliding scale      OTHER: . Right elbow xray with joint effusion and concern for occult fx   No acute fracture dislocation of OCs abnormality noted no, pain control per primary team, no intervention from Ortho.     PROPHYLAXIS:                 VTE: SCDs     DISPOSITION:              Ok to transfer out of ICU today            Beatris Sr MD  Internal Medicine PGY2  4/18/2019 7:27 AM

## 2019-04-18 NOTE — PROGRESS NOTES
Pharmacy Note  Warfarin Consult    Hilario Castellon is a 80 y.o. male for whom pharmacy has been consulted to manage warfarin therapy. Consulting Physician: America Haynes  Reason for Admission: Rhabdomyolysis     Warfarin dose prior to admission: 7.5 mg on Tuesday, Thursday, Saturday and 5 mg all other days of the week  Warfarin indication: Atrial Fibrillation   Target INR range: 2.0-3.0    No past medical history on file. Recent Labs     04/18/19  0719   INR 1.7     Recent Labs     04/16/19  0406 04/17/19  0447 04/18/19  0437   HGB 11.1* 10.8* 9.6*   HCT 35.9* 34.9* 32.3*    164 157       Current warfarin drug-drug interactions: Acetaminophen, Simvastatin, Ondansetron     Date INR Dose   04/18/19 1.7 7.5 mg       A one time dose of 7.5 mg will be ordered today. Dosing will be based on daily PT/INR while inpatient. Thank you for the consult. Will continue to follow. Radha Bobby, Pharm. D. 4/18/2019 12:32 PM

## 2019-04-18 NOTE — PROGRESS NOTES
Physical Therapy  Facility/Department: [address] SICU  Daily Treatment Note  NAME: Danie Guadalupe  : 10/10/1924  MRN: [id number]    Date of Service: 2019    Discharge Recommendations:Further therapy recommended at discharge. Patient Diagnosis(es): The primary encounter diagnosis was Traumatic rhabdomyolysis, initial encounter ([facility] [address] Diagnoses of Closed fracture of multiple ribs of right side, initial encounter, Dehydration, Lactic acidosis, Abdominal aortic aneurysm (AAA) without rupture ([facility] [address] and Contusion of right lung, initial encounter were also pertinent to this visit. has no past medical history on file. has no past surgical history on file. Restrictions  Restrictions/Precautions  Restrictions/Precautions: General Precautions, Fall Risk, Up as Tolerated  Required Braces or Orthoses?: No  Position Activity Restriction  Other position/activity restrictions: patient with 3 R broken ribs   Subjective   General  Response To Previous Treatment: Patient with no complaints from previous session. Family / Caregiver Present: No  Subjective  Subjective: RN and Pt agreeable to PT. Pt alert in bed with RN in room. left pt in chair  with breakfast set before him, chair alrm in place, call light in reach. General Comment  Comments: Writer assist RN in  Pt care. Pain Screening  Patient Currently in Pain: Denies  Vital Signs  Patient Currently in Pain: Denies       Orientation  Orientation  Overall Orientation Status: Impaired  Orientation Level: Oriented to person;Oriented to situation;Disoriented to place; Disoriented to time  Cognition      Objective   Bed mobility  Rolling to Left: Moderate assistance  Rolling to Right: Moderate assistance  Supine to Sit: Moderate assistance  Scooting: Moderate assistance  Comment: Rollling R<>L  x2 for [facility] requiring Mod A for all mobility. Sat EOb fopr 10minutes. Transfers  Sit to Stand: Maximum Assistance; Moderate Assistance(S<>S x3 , requiring Max and progressing Mod A after 2 trial. Pt educated on safety with transfer and Visual cues for proper and safe technique  to rise to full standing with good return.)  Stand to sit: Moderate Assistance;Minimal Assistance( with multiple reps)  Bed to Chair: Moderate assistance(Pt very fearul of falling requiring Max encouragement  and education on safety.)  Stand Pivot Transfers: Moderate Assistance  Comment: Pt educated on safety and pacing  Ambulation  Ambulation?: Yes  Ambulation 1  Surface: level tile  Device: [address]: Moderate assistance  Quality of Gait: decrease gris, difficulty advancing legs d/t fear of falling. Max encouragement and education on safety provided with good return. Distance: 8steps to chair. Comments: Pt educated on use of RW for safety with ambulation. Balance  Posture: Fair  Sitting - Static: Fair; +(Sat at EOb ~10minutes )  Sitting - Dynamic: Fair  Standing - Static: Poor;+  Standing - Dynamic: Poor  Comments: Standing assessed with RW. Exercises  Hip Flexion: 10reps  Knee Long Arc Quad: 10resps  Ankle Pumps: 10reps  Shoulder Active Range of Motion: AROM 10reps  Shoulder Passive Range of Motion: AAROM R Shoulder 10reps all planes  Core Strengthening: EOb 10minutes       Assessment   Body structures, Functions, Activity limitations: Decreased functional mobility ; Decreased cognition;Decreased endurance;Decreased balance  Assessment: Pt able to amb 8steps  from bed to chair using RW and Mod A, very fearful of falling limiting distance. Prognosis: Fair  Patient Education: safe mobility. and use of AD(RW) for safety.   REQUIRES PT FOLLOW UP: Yes  Activity Tolerance  Activity Tolerance: Patient limited by fatigue;Patient limited by endurance  Activity Tolerance: Rest as needed     Goals  Short term goals  Time Frame for Short term goals: 12 visits  Short term goal 1: bed mobility with SBA+1  Short term goal 2: transfers with min A+1  Short term goal 3: gait with rw x 25' with min A+1 Short term goal 4: WC mobility x 25' with SBA+1   Patient Goals   Patient goals : \"not fall\"    Plan    Plan  Times per week: 6-12 visits weekly   Times per day: (1-2 visits daily)  Current Treatment Recommendations: Strengthening, ROM, Balance Training, Functional Mobility Training, Transfer Training, Cognitive/Perceptual Training, Endurance Training, Wheelchair Mobility Training, Gait Training, Stair training, Neuromuscular Re-education, Cognitive Reorientation, Home Exercise Program, Safety Education & Training, Patient/Caregiver Education & Training, Equipment Evaluation, Education, & procurement, Positioning  Safety Devices  Type of devices: Call light within reach, Gait belt, Patient at risk for falls, Nurse notified, Left in chair, Chair alarm in place  Restraints  Initially in place: No     Therapy Time   Individual Concurrent Group Co-treatment   Time In 0804         Time Out 0845         Minutes [address] [place] [address]

## 2019-04-18 NOTE — CARE COORDINATION
Discharge [facility] Case Management Department  Written by: Alcon Aguayo RN    Patient Name: King Ludin  Attending Provider: Barber Giordano DO  Admit Date: 4/15/2019 11:59 AM  MRN: [id number]  Account: [de-identified]                     : 10/10/1924  Discharge Date:       Disposition: [facility]    Alcon Aguayo RN

## 2019-04-18 NOTE — PLAN OF CARE
Problem: Falls - Risk of:  Goal: Will remain free from falls  Description  Will remain free from falls  4/17/2019 2047 by Elisha Chou RN  Outcome: Ongoing  4/17/2019 1407 by Isabela Cardenas RN  Outcome: Ongoing  Goal: Absence of physical injury  Description  Absence of physical injury  4/17/2019 2047 by Elisha Chou RN  Outcome: Ongoing  4/17/2019 1407 by Isabela Cardenas RN  Outcome: Ongoing     Problem: Risk for Impaired Skin Integrity  Goal: Tissue integrity - skin and mucous membranes  Description  Structural intactness and normal physiological function of skin and  mucous membranes.   4/17/2019 2047 by Elisha Chou RN  Outcome: Ongoing  4/17/2019 1407 by Isabela Cardenas RN  Outcome: Ongoing     Problem: Pain:  Goal: Pain level will decrease  Description  Pain level will decrease  4/17/2019 2047 by Elisha Chou RN  Outcome: Ongoing  4/17/2019 1407 by Isabela Cardenas RN  Outcome: Ongoing  Goal: Control of acute pain  Description  Control of acute pain  4/17/2019 2047 by Elisha Chou RN  Outcome: Ongoing  4/17/2019 1407 by Isabela Cardenas RN  Outcome: Ongoing  Goal: Control of chronic pain  Description  Control of chronic pain  4/17/2019 2047 by Elisha Chou RN  Outcome: Ongoing  4/17/2019 1407 by Isabela Cardenas RN  Outcome: Ongoing

## 2019-04-18 NOTE — PROGRESS NOTES
Report given to [facility] via telephone- Trae Foots scheduled to pick pt up at 1630. Pt's daughters to take home all belongings except pt's eye glasses, which he will be wearing.

## 2019-04-18 NOTE — PLAN OF CARE
Problem: Falls - Risk of:  Goal: Will remain free from falls  Description  Will remain free from falls  4/18/2019 0909 by Jaimie Ho RN  Outcome: Ongoing  4/17/2019 2047 by Elisha Chou RN  Outcome: Ongoing     Problem: Falls - Risk of:  Goal: Absence of physical injury  Description  Absence of physical injury  4/18/2019 0909 by Jaimie Ho RN  Outcome: Ongoing     Problem: Risk for Impaired Skin Integrity  Goal: Tissue integrity - skin and mucous membranes  Description  Structural intactness and normal physiological function of skin and  mucous membranes.   4/18/2019 0909 by Jaimie Ho RN  Outcome: Ongoing     Problem: Pain:  Goal: Pain level will decrease  Description  Pain level will decrease  4/18/2019 0909 by Jaimie Ho RN  Outcome: Ongoing     Problem: Pain:  Goal: Control of acute pain  Description  Control of acute pain  4/18/2019 0909 by Jaimie Ho RN  Outcome: Ongoing

## 2019-04-18 NOTE — PROGRESS NOTES
Pt d/c'd to [facility] in [place] [facility] [place] with Isis Skinner. IV removed and pt positioned onto stretcher.

## 2019-04-18 NOTE — PROGRESS NOTES
Speech Language Pathology  Speech Language Pathology  [facility]    Dysphagia Treatment Note    Date: 4/18/2019  Patients Name: Jose Carlos  MRN: [id number]  Diagnosis: dysphagia   Patient Active Problem List   Diagnosis Code    Rhabdomyolysis M62.82       Pain: 0/10    Dysphagia Treatment  Treatment time: [id number]    Subjective: [x] Alert [x] Cooperative     [] Confused     [] Agitated    [] Lethargic    Objective/Assessment:    Pt. Seen for diet tolerance. Pt. Completed bedside swallow study on 4/16. Level I puree diet with thin liquid was recommended. Spoke with RN, pt. With no s/s of aspiration with all PO. Demonstrating some difficulty taking pills. Pt. Up in chair. Finishing breakfast.  Daughter present and reports no difficulty with pureed breakfast and thin liquid. Pt. Given trials of thin liquid, puree and soft solids. No s/s of aspiration with all consistencies tested. Moderate swallow delay noted with puree. Mild extended mastication noted with soft solids. Minimal lingual residual noted with soft solids. ST to recommend upgrading diet to level II moist soft solid diet, continue with thin liquid. Pt. Provided with education re: safe swallow strategies. Pt. Verbalized understanding. If s/s of aspiration occur, d/c all PO and recommend video swallow study to r/o/confirm aspiration. Results and recommendations reported to RN. Plan:  [x] Continue  services    [] Discharge from :        Discharge recommendations: [] Inpatient Rehab   [] [place]   [] Outpatient Therapy  [] Follow up at trauma clinic   [x] Other: therapy       Treatment completed by: Javad Tanner M.A.  CCC-SLP

## 2019-04-18 NOTE — PROGRESS NOTES
Smoking Cessation - topics covered   []  Health Risks  []  Benefits of Quitting   []  Smoking Cessation  [x]  Patient has no history of tobacco use  []  Patient is former smoker. [x]  No need for tobacco cessation education. []  Booklet given  []  Patient verbalizes understanding. []  Patient denies need for tobacco cessation education. []  Unable to meet with patient today. Will follow up as able.   Ritu Lainez  8:08 AM

## 2019-04-18 NOTE — DISCHARGE INSTR - COC
Continuity of Care Form    Patient Name: Piper Starks   :  10/10/1924  MRN:  [id number]    Admit date:  4/15/2019  Discharge date:  19      Code Status Order: Full Code   Advance Directives:   Advance Care Flowsheet Documentation     Date/Time Healthcare Directive Type of Healthcare Directive Copy in [address] Agent's Name Healthcare Agent's Phone Number    04/15/19   Yes, patient has an advance directive for healthcare treatment  Durable power of  for health care;Living will  No, copy requested from family  --  --  --          Admitting Physician:  Maggy Dooley DO  PCP: Anaya Santos MD    Discharging Nurse: [facility] Unit/Room#: 2006/9538-29  Discharging Unit Phone Number: [phone]    Emergency Contact:   Extended Emergency Contact Information  Primary Emergency Contact: [address] [facility], [address] [place] [address] Phone: [phone]  Mobile Phone: [phone]  Relation: Child  Secondary Emergency Contact: Teri Sutherland  Mobile Phone: [phone]  Relation: Child    Past Surgical History:  No past surgical history on file. Immunization History: There is no immunization history on file for this patient.     Active Problems:  Patient Active Problem List   Diagnosis Code    Rhabdomyolysis M62.82       Isolation/Infection:   Isolation          No Isolation            Nurse Assessment:  Last Vital Signs: BP (!) 92/50   Pulse 83   Temp 97.7 °F (36.5 °C) (Oral)   Resp 21   Ht 5' 7\" (1.702 m)   Wt 153 lb 14.1 oz (69.8 kg)   SpO2 100%   BMI 24.10 kg/m²     Last documented pain score (0-10 scale): Pain Level: 0  Last Weight:   Wt Readings from Last 1 Encounters:   19 153 lb 14.1 oz (69.8 kg)     Mental Status:  oriented, alert and slightly confused at times- unaware of year- very appropriate    IV Access:  PIV x 1- left forearm- placed 19    Nursing Mobility/ADLs:  Walking   Assisted  Transfer  Assisted  Bathing Assisted  Dressing  Assisted  Toileting  Assisted  Feeding  Independent - needs set up  Med Admin  Assisted  Med Delivery   whole    Wound Care Documentation and Therapy:  Wound 04/15/19 Elbow Right purple, black suspected deep tissue injury (Active)   Wound Image   4/16/2019  9:30 AM   Wound Deep tissue/Injury 4/18/2019  3:53 PM   Dressing Status Changed 4/18/2019  3:53 PM   Dressing Changed Changed/New 4/17/2019  9:00 AM   Dressing/Treatment Wound gel 4/18/2019  3:53 PM   Wound Cleansed Rinsed/Irrigated with saline 4/16/2019  9:30 AM   Dressing Change Due 04/21/19 4/18/2019  3:53 PM   Wound Width (cm) 4 cm 4/16/2019  9:30 AM   Wound Depth (cm) 3 cm 4/16/2019  9:30 AM   Wound Assessment Edema;Purple;Black 4/18/2019  3:53 PM   Drainage Amount Scant 4/18/2019  3:53 PM   Drainage Description Serosanguinous 4/18/2019  3:53 PM   Odor None 4/18/2019  3:53 PM   Number of days: 3       Wound 04/15/19 Buttocks Right; Inner purple, suspected deep tissue injury (Active)   Wound Image   4/16/2019  9:30 AM   Wound Pressure Unstageable 4/18/2019  3:53 PM   Dressing Status Clean;Dry; Intact 4/18/2019 12:00 PM   Dressing Changed Changed/New 4/18/2019 12:00 PM   Dressing/Treatment Barrier Film 4/18/2019  3:53 PM   Dressing Change Due 04/18/19 4/18/2019 12:00 PM   Wound Length (cm) 3 cm 4/16/2019  9:30 AM   Wound Width (cm) 3.5 cm 4/16/2019  9:30 AM   Wound Surface Area (cm^2) 10.5 cm^2 4/16/2019  9:30 AM   Wound Assessment Intact; Purple 4/18/2019  3:53 PM   Drainage Amount None 4/18/2019  3:53 PM   Odor None 4/18/2019 12:00 PM   Eri-wound Assessment Purple;Red;Fragile;Burgundy;Edema 4/18/2019 12:00 PM   Number of days: 3       Wound 04/15/19 Rib Cage purple suspected deep tissue injury  (Active)   Wound Image   4/16/2019  9:30 AM   Wound Deep tissue/Injury 4/18/2019  7:30 AM   Dressing Changed Changed/New 4/18/2019 12:00 PM   Dressing/Treatment Barrier Film;Open to air 4/18/2019 12:00 PM   Dressing Change Due 04/18/19 4/18/2019 Due 04/17/19 4/17/2019  4:00 PM   Wound Length (cm) 7.5 cm 4/16/2019  9:30 AM   Wound Width (cm) 4.5 cm 4/16/2019  9:30 AM   Wound Surface Area (cm^2) 33.75 cm^2 4/16/2019  9:30 AM   Wound Assessment Purple;Maroon;Red; Other (Comment) 4/18/2019 12:00 PM   Drainage Amount None 4/18/2019 12:00 PM   Drainage Description Serous 4/18/2019  4:00 AM   Odor None 4/18/2019 12:00 PM   Number of days: 3       Wound 04/15/19 Knee Right;Lateral red, purple, brown abrasion  (Active)   Wound Image   4/16/2019  9:30 AM   Wound Pressure Unstageable 4/18/2019  7:30 AM   Dressing Status Changed 4/18/2019  3:53 PM   Dressing Changed Changed/New 4/17/2019  9:00 AM   Dressing/Treatment Wound gel;Dry Dressing 4/18/2019  3:53 PM   Wound Cleansed Rinsed/Irrigated with saline 4/18/2019  3:53 PM   Dressing Change Due 04/19/19 4/18/2019  3:53 PM   Wound Length (cm) 1.4 cm 4/16/2019  9:30 AM   Wound Width (cm) 1.9 cm 4/16/2019  9:30 AM   Wound Surface Area (cm^2) 2.66 cm^2 4/16/2019  9:30 AM   Wound Assessment Villanueva;Fibrin 4/18/2019  3:53 PM   Drainage Amount Scant 4/18/2019  3:53 PM   Drainage Description Serosanguinous 4/18/2019  3:53 PM   Odor None 4/18/2019 12:00 PM   Eri-wound Assessment Pink 4/18/2019  3:53 PM   Number of days: 3       Wound 04/15/19 Tibial Right;Lateral purple, brown, deep tissue injury  (Active)   Wound Image   4/16/2019  9:30 AM   Wound Deep tissue/Injury 4/18/2019  7:30 AM   Dressing Status Clean;Dry; Intact 4/18/2019 12:00 PM   Dressing Changed Changed/New 4/18/2019 12:00 PM   Dressing/Treatment Barrier Film;Open to air 4/18/2019 12:00 PM   Dressing Change Due 04/18/19 4/18/2019 12:00 PM   Wound Length (cm) 2 cm 4/16/2019  9:30 AM   Wound Width (cm) 1 cm 4/16/2019  9:30 AM   Wound Surface Area (cm^2) 2 cm^2 4/16/2019  9:30 AM   Wound Assessment Purple; Intact 4/18/2019 12:00 PM   Drainage Amount None 4/18/2019 12:00 PM   Odor None 4/18/2019 12:00 PM   Number of days: 3       Wound 04/15/19 Arm Right; Lower bruising,

## 2019-04-21 LAB
CULTURE: NORMAL
CULTURE: NORMAL
Lab: NORMAL
Lab: NORMAL
SPECIMEN DESCRIPTION: NORMAL
SPECIMEN DESCRIPTION: NORMAL

## 2019-04-24 NOTE — DISCHARGE SUMMARY
Internal Medicine Discharge Summary         Patient Identification:  Yogi Stevens is a 80 y.o. male. :  10/10/1924  MRN: [id number]     Acct: [de-identified]   Admit Date:  4/15/2019  Discharge date and time: 2019  5:10 PM   Attending Provider: No att. providers found                                     Admission Diagnoses:   Rhabdomyolysis [M62.82]    Discharge Diagnoses:   Patient Active Problem List   Diagnosis    Hypertension    CAD (coronary artery disease)    Aortic regurgitation    Mitral regurgitation    Atrial fibrillation    Ascending aortic aneurysm    Valvular heart disease    Hyperlipidemia    COPD [facility]chronic obstructive pulmonary disease) ([facility])    Diastolic dysfunction    Anemia    Fatigue    Dementia    Osteoarthritis    Rhabdomyolysis        Consults:   cardiology      Brief Inpatient course:  69-year-old male with history of ascending aortic aneurysm replacement, aortic wall replacement, mitral valve repair and atrial fibrillation on chronic anti-coagulation with Coumadin, dementia was found down in his but do for unknown duration. He was found to be confused and have multiple right-sided rib fractures with small hemo/pneumothorax. Patient was evaluated  by trauma team, no intervention was planned. Patient also has  VALARIE secondary to rhabdomyolysis and  lactic acidosis. Patient was started on IV fluids. CT head was done which was negative patient was in A. fib so was started on Cardizem drip. Cardiology was consulted and was started on Lopressor 12.5 mg twice a day and he was converted to sinus rhythm and Cardizem drip was turned off. Didn't resume his home meds digoxin and Cardizem as patient's heart rate was well controlled with Lopressor. There was no intervention planned from cardiology. During his stay his Coumadin was resumed which he used to take for atrial fibrillation    Was  [facility] directly from ICU after being stabilized.     Procedures: none    Any Hospital Acquired Infections: none      Discharge Functional Status:    stable    Disposition:   SNF      Patient Instructions:   Discharge Meds:-   Discharge Medication List as of 4/18/2019  2:10 PM      CONTINUE these medications which have NOT CHANGED    Details   latanoprost (XALATAN) 0.005 % ophthalmic solution Place 1 drop into both eyes nightlyHistorical Med      aspirin 81 MG EC tablet Take 81 mg by mouth every eveningHistorical Med      digoxin (LANOXIN) 125 MCG tablet Take 125 mcg by mouth dailyHistorical Med      diltiazem (CARDIZEM 12 HR) 120 MG extended release capsule Take 120 mg by mouth dailyHistorical Med      donepezil (ARICEPT) 10 MG tablet Take 10 mg by mouth nightlyHistorical Med      furosemide (LASIX) 40 MG tablet Take 40 mg by mouth 2 times dailyHistorical Med      lovastatin (MEVACOR) 20 MG tablet Take 20 mg by mouth nightlyHistorical Med      Omega-3 Fatty Acids (FISH OIL) 1200 MG CAPS Take 1 capsule by mouth 2 times dailyHistorical Med      metoprolol tartrate (LOPRESSOR) 25 MG tablet Take 12.5 mg by mouth 2 times dailyHistorical Med      midodrine (PROAMATINE) 5 MG tablet Take 5 mg by mouth 3 times dailyHistorical Med      Cholecalciferol (VITAMIN D3) 1000 units CAPS Take 2 capsules by mouth dailyHistorical Med      warfarin (COUMADIN) 5 MG tablet Take 7.5 mg by mouth Take 7.5 mg on tues, thurs and Saturday. Take 5 mg on mon, wed, fri, sundayHistorical Med           Activity: activity as tolerated  Diet: Dysphagia diet    Follow-up with PCP within 2 weeks  Discharge Medications:  [unfilled]    Time spent on discharge process is around 35 minutes.     Marielena Chang MD

## 2019-05-16 ENCOUNTER — HOSPITAL ENCOUNTER (OUTPATIENT)
Dept: MRI IMAGING | Age: 84
Discharge: HOME | End: 2019-05-18
Payer: MEDICARE

## 2019-05-16 DIAGNOSIS — M86.9 OSTEOMYELITIS OF RIGHT ELBOW (HCC): ICD-10-CM

## 2019-05-16 PROCEDURE — 73221 MRI JOINT UPR EXTREM W/O DYE: CPT

## 2019-06-10 ENCOUNTER — OFFICE VISIT (OUTPATIENT)
Dept: ORTHOPEDIC SURGERY | Age: 84
End: 2019-06-10
Payer: MEDICARE

## 2019-06-10 VITALS — BODY MASS INDEX: 24.15 KG/M2 | WEIGHT: 153.88 LBS | HEIGHT: 67 IN

## 2019-06-10 DIAGNOSIS — L89.019 PRESSURE INJURY OF SKIN OF RIGHT ELBOW, UNSPECIFIED INJURY STAGE: Primary | ICD-10-CM

## 2019-06-10 PROCEDURE — 1036F TOBACCO NON-USER: CPT | Performed by: ORTHOPAEDIC SURGERY

## 2019-06-10 PROCEDURE — 99203 OFFICE O/P NEW LOW 30 MIN: CPT | Performed by: ORTHOPAEDIC SURGERY

## 2019-06-10 PROCEDURE — 4040F PNEUMOC VAC/ADMIN/RCVD: CPT | Performed by: ORTHOPAEDIC SURGERY

## 2019-06-10 PROCEDURE — G8598 ASA/ANTIPLAT THER USED: HCPCS | Performed by: ORTHOPAEDIC SURGERY

## 2019-06-10 PROCEDURE — G8427 DOCREV CUR MEDS BY ELIG CLIN: HCPCS | Performed by: ORTHOPAEDIC SURGERY

## 2019-06-10 PROCEDURE — G8420 CALC BMI NORM PARAMETERS: HCPCS | Performed by: ORTHOPAEDIC SURGERY

## 2019-06-10 PROCEDURE — 1123F ACP DISCUSS/DSCN MKR DOCD: CPT | Performed by: ORTHOPAEDIC SURGERY

## 2019-06-15 NOTE — PROGRESS NOTES
Orthopedic Elbow Encounter Note     Chief complaint: Right elbow wound    HPI: Geoffrey Leon is a 80 y.o. Right-hand dominant male who presents for evaluation of his right elbow. On 4/12/2019 he fell but was not found until 4/15/2019. He was noted to have a wound at the posterior aspect of the elbow. He also complained of pain initially. He was seen on consultation by the orthopedic team at [facility]. Yuan's for possible occult fracture. His wound is been managed by wound care. At this time he denies having any pain. He presents today because an MRI was obtained and findings suggested the possibility of osteomyelitis. He denies having any fevers, chills, sweats or any constitutional symptoms. And per the patient's patient's daughter his wound is actually looking better with wound care. Previous treatment:    NSAIDs: None    Injections: None    Physical therapy: No    Surgeries: None    Review of Systems:     Constitution: no fever or chills   Pain level: 0/10  Musculoskeletal: As noted in the HPI   Neurologic: no neurologic symptoms    Past Medical Hx, Past Surgical Hx, Medications, Allergies, Social Hx: These were all reviewed. Please refer to Electronic Medical Records for details. Physical Exam:     Ht 5' 7.01\" (1.702 m)   Wt 153 lb 14.1 oz (69.8 kg)   BMI 24.10 kg/m²    General Appearance: alert, well appearing, and in no distress  Mental Status: alert, oriented to person, place, and time  Gait: normal    Elbows:    Skin: warm and dry, no rash or erythema. Posterior aspect of the right elbow over the olecranon has a 1.5 x 3 cm wound. Minimal surrounding erythema. No induration or swelling noted. No active drainage. The wound appears to have healthy granulation tissue.   Vasculature: 2+ radial pulses bilaterally  Sensation: Intact to light touch in radial, ulnar, and median nerve distributions bilaterally    ROM: (Degrees)    Right   A  Left   A     Flexion   130  Flexion   130   Extension  20  Extension  0    Supination  70  Supinaton  80   Pronation  70  Pronation  80     Crepitation  No  Crepitation  No    Muscle strength:    Right       Left    Biceps   5    Biceps   5  Triceps  5    Triceps  5  Wrist flexion  5    Wrist flexion  5  Wrist extension 5    Wrist extension 5  Intrinsics  5    Intrinsics  5    Tenderness: None     Tenderness: None    Special tests    Right    Ulnar Nerve     Left  n    Cubital tunnel bend    n  n    Tinnel's at elbow    n        Biceps  n    Bicipital tenderness    n  n    Defect at biceps insertion   n        Instability  n    LCL instability     n  n    MCL instability     n  n    Moving valgus test    n  n    Milk sign     n  n    PLRI pivot     n        Epicondylitis  n    Resisted WE Pain    n  n    Resisted WF Pain    n  n    Resisted Supination Pain   n  n    Resisted Pronation Pain   n        Arthritis  n    Clunk      n  n    Pain at extremes of motion   n  n    Pain during arc of motion   n      Imaging:  MRI: MRI of the right elbow reviewed from 5/16/2019 demonstrates what appears to be a small area of edema within the tip of the olecranon T2-weighted images with some surrounding soft tissue edema as well. Impression/Plan:     Ivanna Collins is a 80 y.o. old male with right elbow pressure ulcer over the olecranon stemming from a prolonged period of being down. At this time he does not strike me as being infected. The edema in the bone certainly could be due to osteomyelitis but it \"could be due to contusion from the fall and prolonged pressure. At this time I recommend continue wound care management with daily wet-to-dry dressing changes. I also recommend further evaluation by infectious disease. From my standpoint I will see him back for reevaluation in 6 to 8 weeks but he was encouraged to return or call earlier with any questions under concerns.       NA = Not assessed  y = Yes  n = No  Now right elbow

## 2019-06-18 ENCOUNTER — OFFICE VISIT (OUTPATIENT)
Dept: INFECTIOUS DISEASES | Age: 84
End: 2019-06-18
Payer: MEDICARE

## 2019-06-18 VITALS
HEIGHT: 67 IN | TEMPERATURE: 97.6 F | SYSTOLIC BLOOD PRESSURE: 124 MMHG | OXYGEN SATURATION: 95 % | BODY MASS INDEX: 26.21 KG/M2 | WEIGHT: 167 LBS | HEART RATE: 60 BPM | RESPIRATION RATE: 14 BRPM | DIASTOLIC BLOOD PRESSURE: 79 MMHG

## 2019-06-18 DIAGNOSIS — L98.499 CHRONIC SKIN ULCER, UNSPECIFIED ULCER STAGE (HCC): Primary | ICD-10-CM

## 2019-06-18 DIAGNOSIS — M86.121: ICD-10-CM

## 2019-06-18 PROBLEM — I34.0 SEVERE MITRAL REGURGITATION: Status: ACTIVE | Noted: 2017-11-09

## 2019-06-18 PROBLEM — Z95.3 HISTORY OF AORTIC VALVE REPLACEMENT WITH BIOPROSTHETIC VALVE: Status: ACTIVE | Noted: 2017-11-09

## 2019-06-18 PROBLEM — I07.1 MODERATE TRICUSPID REGURGITATION: Status: ACTIVE | Noted: 2017-11-09

## 2019-06-18 PROBLEM — R06.02 SOB (SHORTNESS OF BREATH) ON EXERTION: Status: ACTIVE | Noted: 2017-11-09

## 2019-06-18 PROBLEM — I35.1 AORTIC VALVE INSUFFICIENCY: Status: ACTIVE | Noted: 2017-11-08

## 2019-06-18 PROBLEM — E78.49 OTHER HYPERLIPIDEMIA: Status: ACTIVE | Noted: 2017-03-07

## 2019-06-18 PROBLEM — R53.82 CHRONIC FATIGUE: Status: ACTIVE | Noted: 2017-11-09

## 2019-06-18 PROBLEM — H93.8X3 SENSATION OF FULLNESS IN BOTH EARS: Status: ACTIVE | Noted: 2018-11-06

## 2019-06-18 PROBLEM — H90.3 BILATERAL SENSORINEURAL HEARING LOSS: Status: ACTIVE | Noted: 2017-04-17

## 2019-06-18 PROBLEM — H93.13 TINNITUS OF BOTH EARS: Status: ACTIVE | Noted: 2019-02-20

## 2019-06-18 PROCEDURE — 1036F TOBACCO NON-USER: CPT | Performed by: INTERNAL MEDICINE

## 2019-06-18 PROCEDURE — G8427 DOCREV CUR MEDS BY ELIG CLIN: HCPCS | Performed by: INTERNAL MEDICINE

## 2019-06-18 PROCEDURE — 1123F ACP DISCUSS/DSCN MKR DOCD: CPT | Performed by: INTERNAL MEDICINE

## 2019-06-18 PROCEDURE — G8598 ASA/ANTIPLAT THER USED: HCPCS | Performed by: INTERNAL MEDICINE

## 2019-06-18 PROCEDURE — 99204 OFFICE O/P NEW MOD 45 MIN: CPT | Performed by: INTERNAL MEDICINE

## 2019-06-18 PROCEDURE — 4040F PNEUMOC VAC/ADMIN/RCVD: CPT | Performed by: INTERNAL MEDICINE

## 2019-06-18 PROCEDURE — G8417 CALC BMI ABV UP PARAM F/U: HCPCS | Performed by: INTERNAL MEDICINE

## 2019-06-18 ASSESSMENT — ENCOUNTER SYMPTOMS
GASTROINTESTINAL NEGATIVE: 1
RESPIRATORY NEGATIVE: 1

## 2019-06-18 NOTE — PROGRESS NOTES
[facility]   Office Consult Note  Today's Date and Time: 6/18/2019, 3:04 PM    Impression:     1. Chronic skin ulcer, unspecified ulcer stage ([address]    2. Acute osteomyelitis of elbow, right ([facility])         Recommendations   · The patient has a chronic nonhealing traumatic ulceration on the right elbow for which she has been getting local wound care with significant improvement according to the family. · The MRI suggests osteomyelitis at the elbow but there are no significant clinical findings to suggest infection at this time. · The wound was in close proximity to the bone and therefore I am not surprised that there would be some changes noted on MRI suggesting a bony infection. · At this point in time the wound itself clinically is doing very well and I would advise close continued local wound care without any invasive procedures such as surgery or IV antimicrobial therapy. · Continue local wound care until this area heals but if there is any signs of progression including soft tissue infection swelling or pain at the elbow joint the patient can always come back in for reevaluation but at this point in time I would continue with conservative management    I have ordered the following medications/ labs:  No orders of the defined types were placed in this encounter. No orders of the defined types were placed in this encounter. Chief complaint/reason for consultation:     Chief Complaint   Patient presents with    Osteomyelitis      Left Elbow        History of Present Illness:   Paramjit Menendez is a 80y.o.-year-old male who is seen at there request of Braydon Yeung MD.  Haider Venegas has a history of coronary artery disease, hypertension, atrial fibrillation, hyperlipidemia, COPD, mild dementia and is living at home and typically is left alone for the weekend ended up falling in his bathroom on Friday night and could not get out until family found him down and is best to have.   He had been EVERY MORNING AND EVENING 120 tablet 11    metoprolol tartrate (LOPRESSOR) 25 MG tablet TAKE 1/2 TABLET BY MOUTH TWICE DAILY 30 tablet 11    clindamycin (CLEOCIN) 300 MG capsule TK 2 CS PO 1 HOUR PRIOR TO APPOINTMENTS  0    Cholecalciferol (VITAMIN D3) 2000 UNITS CAPS Take 1 capsule by mouth daily      latanoprost (XALATAN) 0.005 % ophthalmic solution Place 1 drop into both eyes nightly 1 Bottle 4    Tetrahydroz-Dextran-PEG-Povid (EYE DROPS ADVANCED RELIEF OP) Apply 1 drop to eye every morning.  Fish Oil-Cholecalciferol (FISH OIL + D3) 1076-6612 MG-UNIT CAPS Take 1 capsule by mouth 2 times daily.  warfarin (COUMADIN) 5 MG tablet Po qd 30 tablet 3    diltiazem (CARDIZEM CD) 120 MG ER capsule Take 1 capsule by mouth daily. 30 capsule 3    digoxin (LANOXIN) 125 MCG tablet Take 1 tablet by mouth daily. 30 tablet 3    midodrine (PROAMATINE) 5 MG tablet Take 1 tablet by mouth 3 times daily (with meals). 90 tablet 3    aspirin 81 MG tablet Take 81 mg by mouth daily. No current facility-administered medications for this visit.        Social History:     Social History     Socioeconomic History    Marital status: Single     Spouse name: Not on file    Number of children: Not on file    Years of education: Not on file    Highest education level: Not on file   Occupational History    Not on file   Social Needs    Financial resource strain: Not on file    Food insecurity:     Worry: Not on file     Inability: Not on file    Transportation needs:     Medical: Not on file     Non-medical: Not on file   Tobacco Use    Smoking status: Never Smoker    Smokeless tobacco: Never Used   Substance and Sexual Activity    Alcohol use: No    Drug use: No    Sexual activity: Not on file   Lifestyle    Physical activity:     Days per week: Not on file     Minutes per session: Not on file    Stress: Not on file   Relationships    Social connections:     Talks on phone: Not on file     Gets together: Not on file     Attends Christian service: Not on file     Active member of club or organization: Not on file     Attends meetings of clubs or organizations: Not on file     Relationship status: Not on file    Intimate partner violence:     Fear of current or ex partner: Not on file     Emotionally abused: Not on file     Physically abused: Not on file     Forced sexual activity: Not on file   Other Topics Concern    Not on file   Social History Narrative    ** Merged History Encounter **          Family History:     Family History   Problem Relation Age of Onset    Heart Attack Mother     Heart Attack Sister     Hypertension Sister       Allergies:   Penicillins     Review of Systems:   Review of Systems   Constitutional: Negative. Respiratory: Negative. Cardiovascular: Positive for leg swelling. Gastrointestinal: Negative. Genitourinary: Negative. Musculoskeletal: Negative. Skin: Positive for wound (right elbow). Neurological: Negative. Physical Examination :   /79 (Site: Left Lower Arm, Position: Sitting)   Pulse 60   Temp 97.6 °F (36.4 °C)   Resp 14   Ht 5' 7\" (1.702 m)   Wt 167 lb (75.8 kg)   SpO2 95% Comment: Room air  BMI 26.16 kg/m²     Physical Exam   Constitutional: He is oriented to person, place, and time. HENT:   Head: Normocephalic and atraumatic. Neck: Normal range of motion. Neck supple. Cardiovascular: Normal rate. Murmur heard. Pulmonary/Chest: Effort normal and breath sounds normal.   Abdominal: Soft. Bowel sounds are normal.   Musculoskeletal: Normal range of motion. He exhibits edema. Neurological: He is alert and oriented to person, place, and time. Skin: Skin is warm and dry.    Right elbow ulcer           Medical Decision Making:   I haveindependently reviewed the following labs:  CBC with Differential:  Lab Results   Component Value Date    WBC 7.9 04/18/2019    WBC 9.1 04/17/2019    HGB 9.6 04/18/2019    HGB 10.8 04/17/2019    HCT 32.3 04/18/2019    HCT 34.9 04/17/2019     04/18/2019     04/17/2019     09/12/2011     09/11/2011    LYMPHOPCT 25 11/02/2017    LYMPHOPCT 8 02/21/2015    MONOPCT 11 11/02/2017    MONOPCT 7 02/21/2015     BMP:  Lab Results   Component Value Date     04/18/2019     04/17/2019    K 3.9 04/18/2019    K 3.8 04/17/2019     04/18/2019     04/17/2019    CO2 24 04/18/2019    CO2 24 04/17/2019    BUN 34 04/18/2019    BUN 32 04/17/2019    CREATININE 0.72 04/18/2019    CREATININE 0.80 04/17/2019    MG 2.5 04/15/2019    MG 2.1 09/09/2011     Hepatic Function Panel:   Lab Results   Component Value Date    PROT 7.2 11/02/2017    PROT 6.6 01/26/2017    LABALBU 4.2 11/02/2017    LABALBU 3.9 01/26/2017    LABALBU 2.7 09/13/2011    LABALBU 3.6 09/07/2011    BILIDIR 0.29 02/21/2015    BILIDIR 0.21 09/13/2011    IBILI 0.72 02/21/2015    IBILI 0.45 09/13/2011    BILITOT 0.59 11/02/2017    BILITOT 0.58 01/26/2017    ALKPHOS 84 11/02/2017    ALKPHOS 86 01/26/2017    ALT 15 11/02/2017    ALT 10 01/26/2017    AST 22 11/02/2017    AST 17 01/26/2017     Lab Results   Component Value Date    CRP 16.9 (H) 02/21/2015     Lab Results   Component Value Date    SEDRATE 17 (H) 02/21/2015       Imaging Studies:   MRI OF THE RIGHT ELBOW WITHOUT CONTRAST, 5/16/2019 10:32 am  Impression   Skin ulceration and focal bone marrow edema in olecranon process suspicious   for osteomyelitis.  A contrast-enhanced study would be useful in further   characterizing this location.       Skin ulceration with soft tissue swelling of the posterior elbow, likely with   cellulitis.       No drainable collection.       Joint effusion, stability indeterminate without aggressive features such as   marginal edema within the intra-articular structures or erosions.       The findings were sent to the Radiology Results [address] at 12:14   pm on 5/16/2019to be communicated to a licensed caregiver.          Thank you for allowing us to participate in the care of this patient. Pleasecall with questions. Karen An MD  [employer] messaging: [phone]    This note is created with the assistance of a speech recognition program.  While intending to generate a document that actually reflects the content ofthe visit, the document can still have some errors including those of syntax and sound a like substitutions which may escape proof reading. It such instances, actual meaning can be extrapolated by contextual diversion.

## 2019-07-22 ENCOUNTER — TELEPHONE (OUTPATIENT)
Dept: FAMILY MEDICINE CLINIC | Age: 84
End: 2019-07-22

## 2019-07-30 ENCOUNTER — OFFICE VISIT (OUTPATIENT)
Dept: FAMILY MEDICINE CLINIC | Age: 84
End: 2019-07-30
Payer: MEDICARE

## 2019-07-30 VITALS
WEIGHT: 178 LBS | HEART RATE: 64 BPM | SYSTOLIC BLOOD PRESSURE: 122 MMHG | BODY MASS INDEX: 27.88 KG/M2 | DIASTOLIC BLOOD PRESSURE: 70 MMHG | OXYGEN SATURATION: 93 %

## 2019-07-30 DIAGNOSIS — F03.90 DEMENTIA WITHOUT BEHAVIORAL DISTURBANCE, UNSPECIFIED DEMENTIA TYPE: Primary | Chronic | ICD-10-CM

## 2019-07-30 DIAGNOSIS — T79.6XXD TRAUMATIC RHABDOMYOLYSIS, SUBSEQUENT ENCOUNTER: ICD-10-CM

## 2019-07-30 PROCEDURE — 1111F DSCHRG MED/CURRENT MED MERGE: CPT | Performed by: FAMILY MEDICINE

## 2019-07-30 PROCEDURE — 1123F ACP DISCUSS/DSCN MKR DOCD: CPT | Performed by: FAMILY MEDICINE

## 2019-07-30 PROCEDURE — G8598 ASA/ANTIPLAT THER USED: HCPCS | Performed by: FAMILY MEDICINE

## 2019-07-30 PROCEDURE — 4040F PNEUMOC VAC/ADMIN/RCVD: CPT | Performed by: FAMILY MEDICINE

## 2019-07-30 PROCEDURE — G8417 CALC BMI ABV UP PARAM F/U: HCPCS | Performed by: FAMILY MEDICINE

## 2019-07-30 PROCEDURE — 1036F TOBACCO NON-USER: CPT | Performed by: FAMILY MEDICINE

## 2019-07-30 PROCEDURE — G8427 DOCREV CUR MEDS BY ELIG CLIN: HCPCS | Performed by: FAMILY MEDICINE

## 2019-07-30 PROCEDURE — 99213 OFFICE O/P EST LOW 20 MIN: CPT | Performed by: FAMILY MEDICINE

## 2019-07-30 ASSESSMENT — ENCOUNTER SYMPTOMS
SHORTNESS OF BREATH: 0
CONSTIPATION: 0
COUGH: 0
BACK PAIN: 1
DIARRHEA: 0
BLOOD IN STOOL: 0
WHEEZING: 0
ABDOMINAL PAIN: 0

## 2019-07-30 ASSESSMENT — PATIENT HEALTH QUESTIONNAIRE - PHQ9
2. FEELING DOWN, DEPRESSED OR HOPELESS: 0
SUM OF ALL RESPONSES TO PHQ9 QUESTIONS 1 & 2: 0
SUM OF ALL RESPONSES TO PHQ QUESTIONS 1-9: 0
1. LITTLE INTEREST OR PLEASURE IN DOING THINGS: 0
SUM OF ALL RESPONSES TO PHQ QUESTIONS 1-9: 0

## 2019-08-30 ENCOUNTER — TELEPHONE (OUTPATIENT)
Dept: FAMILY MEDICINE CLINIC | Age: 84
End: 2019-08-30

## 2019-09-04 NOTE — TELEPHONE ENCOUNTER
I would try to stay away from seroquel but ok in trying paxil 10 mg one a day if no improvement in 30 days we can titrate this dose up. #30 x 3.

## 2019-11-20 PROBLEM — E78.49 OTHER HYPERLIPIDEMIA: Status: RESOLVED | Noted: 2017-03-07 | Resolved: 2019-11-20

## 2019-11-20 PROBLEM — H93.13 TINNITUS OF BOTH EARS: Status: RESOLVED | Noted: 2019-02-20 | Resolved: 2019-11-20

## 2019-12-11 PROBLEM — I07.1 MODERATE TRICUSPID REGURGITATION: Status: RESOLVED | Noted: 2017-11-09 | Resolved: 2019-12-11

## 2019-12-11 PROBLEM — I34.0 SEVERE MITRAL REGURGITATION: Status: RESOLVED | Noted: 2017-11-09 | Resolved: 2019-12-11

## 2019-12-30 ENCOUNTER — TELEPHONE (OUTPATIENT)
Dept: FAMILY MEDICINE CLINIC | Age: 84
End: 2019-12-30

## 2023-08-16 NOTE — CARE COORDINATION
[id number]   2019    Patient: Natalie Nyhan Patient : 10/10/1924   MRN: [id number]  Reason for Admission: rhabdo, fall  RARS: Readmission Risk Score: 20    Spoke with: patient & his daughter at bedside.     nHpredict Inpatient Visit    Patient/daughter seen: Yes    Provided patient/daughter with copy of nHpredict tool: Yes     Current discharge plan: SNF    Collaboration completed with case management: yes - spoke with Shea Sheppard CM    Readmission Risk  Patient Active Problem List   Diagnosis    Rhabdomyolysis       Junie Valencia RN MD at bedside.